# Patient Record
Sex: MALE | Race: BLACK OR AFRICAN AMERICAN | NOT HISPANIC OR LATINO | URBAN - METROPOLITAN AREA
[De-identification: names, ages, dates, MRNs, and addresses within clinical notes are randomized per-mention and may not be internally consistent; named-entity substitution may affect disease eponyms.]

---

## 2018-09-06 ENCOUNTER — INPATIENT (INPATIENT)
Facility: HOSPITAL | Age: 53
LOS: 7 days | Discharge: HOME | DRG: 236 | End: 2018-09-14
Attending: THORACIC SURGERY (CARDIOTHORACIC VASCULAR SURGERY) | Admitting: THORACIC SURGERY (CARDIOTHORACIC VASCULAR SURGERY)
Payer: MEDICAID

## 2018-09-06 VITALS
SYSTOLIC BLOOD PRESSURE: 114 MMHG | RESPIRATION RATE: 18 BRPM | TEMPERATURE: 98 F | WEIGHT: 153 LBS | DIASTOLIC BLOOD PRESSURE: 72 MMHG | HEART RATE: 73 BPM | OXYGEN SATURATION: 98 %

## 2018-09-06 DIAGNOSIS — Z98.1 ARTHRODESIS STATUS: Chronic | ICD-10-CM

## 2018-09-06 LAB
ALBUMIN SERPL ELPH-MCNC: 4.1 G/DL — SIGNIFICANT CHANGE UP (ref 3.3–5)
ALP SERPL-CCNC: 105 U/L — SIGNIFICANT CHANGE UP (ref 40–120)
ALT FLD-CCNC: 24 U/L — SIGNIFICANT CHANGE UP (ref 10–45)
ANION GAP SERPL CALC-SCNC: 17 MMOL/L — SIGNIFICANT CHANGE UP (ref 5–17)
APPEARANCE UR: CLEAR — SIGNIFICANT CHANGE UP
APTT BLD: 31.5 SEC — SIGNIFICANT CHANGE UP (ref 27.5–37.4)
AST SERPL-CCNC: 24 U/L — SIGNIFICANT CHANGE UP (ref 10–40)
BASOPHILS NFR BLD AUTO: 0.1 % — SIGNIFICANT CHANGE UP (ref 0–2)
BILIRUB SERPL-MCNC: 0.6 MG/DL — SIGNIFICANT CHANGE UP (ref 0.2–1.2)
BILIRUB UR-MCNC: NEGATIVE — SIGNIFICANT CHANGE UP
BLD GP AB SCN SERPL QL: NEGATIVE — SIGNIFICANT CHANGE UP
BUN SERPL-MCNC: 14 MG/DL — SIGNIFICANT CHANGE UP (ref 7–23)
CALCIUM SERPL-MCNC: 12.2 MG/DL — HIGH (ref 8.4–10.5)
CHLORIDE SERPL-SCNC: 100 MMOL/L — SIGNIFICANT CHANGE UP (ref 96–108)
CHOLEST SERPL-MCNC: 159 MG/DL — SIGNIFICANT CHANGE UP (ref 10–199)
CK MB CFR SERPL CALC: 1 NG/ML — SIGNIFICANT CHANGE UP (ref 0–6.7)
CK SERPL-CCNC: 46 U/L — SIGNIFICANT CHANGE UP (ref 30–200)
CO2 SERPL-SCNC: 21 MMOL/L — LOW (ref 22–31)
COLOR SPEC: YELLOW — SIGNIFICANT CHANGE UP
CREAT SERPL-MCNC: 0.97 MG/DL — SIGNIFICANT CHANGE UP (ref 0.5–1.3)
DIFF PNL FLD: NEGATIVE — SIGNIFICANT CHANGE UP
EOSINOPHIL NFR BLD AUTO: 1.6 % — SIGNIFICANT CHANGE UP (ref 0–6)
ESTIMATED AVERAGE GLUCOSE: 126 MG/DL — HIGH (ref 68–114)
GLUCOSE BLDC GLUCOMTR-MCNC: 113 MG/DL — HIGH (ref 70–99)
GLUCOSE BLDC GLUCOMTR-MCNC: 136 MG/DL — HIGH (ref 70–99)
GLUCOSE SERPL-MCNC: 123 MG/DL — HIGH (ref 70–99)
GLUCOSE UR QL: NEGATIVE — SIGNIFICANT CHANGE UP
HBA1C BLD-MCNC: 6 % — HIGH (ref 4–5.6)
HCT VFR BLD CALC: 41.9 % — SIGNIFICANT CHANGE UP (ref 39–50)
HDLC SERPL-MCNC: 44 MG/DL — SIGNIFICANT CHANGE UP
HGB BLD-MCNC: 13.9 G/DL — SIGNIFICANT CHANGE UP (ref 13–17)
INR BLD: 1.02 — SIGNIFICANT CHANGE UP (ref 0.88–1.16)
KETONES UR-MCNC: NEGATIVE — SIGNIFICANT CHANGE UP
LEUKOCYTE ESTERASE UR-ACNC: NEGATIVE — SIGNIFICANT CHANGE UP
LIPID PNL WITH DIRECT LDL SERPL: 75 MG/DL — SIGNIFICANT CHANGE UP
LYMPHOCYTES # BLD AUTO: 22.2 % — SIGNIFICANT CHANGE UP (ref 13–44)
MCHC RBC-ENTMCNC: 27.9 PG — SIGNIFICANT CHANGE UP (ref 27–34)
MCHC RBC-ENTMCNC: 33.2 G/DL — SIGNIFICANT CHANGE UP (ref 32–36)
MCV RBC AUTO: 84.1 FL — SIGNIFICANT CHANGE UP (ref 80–100)
MONOCYTES NFR BLD AUTO: 7.6 % — SIGNIFICANT CHANGE UP (ref 2–14)
NEUTROPHILS NFR BLD AUTO: 68.5 % — SIGNIFICANT CHANGE UP (ref 43–77)
NITRITE UR-MCNC: NEGATIVE — SIGNIFICANT CHANGE UP
NT-PROBNP SERPL-SCNC: 130 PG/ML — SIGNIFICANT CHANGE UP (ref 0–300)
PH UR: 6.5 — SIGNIFICANT CHANGE UP (ref 5–8)
PLATELET # BLD AUTO: 285 K/UL — SIGNIFICANT CHANGE UP (ref 150–400)
POTASSIUM SERPL-MCNC: 4.3 MMOL/L — SIGNIFICANT CHANGE UP (ref 3.5–5.3)
POTASSIUM SERPL-SCNC: 4.3 MMOL/L — SIGNIFICANT CHANGE UP (ref 3.5–5.3)
PROT SERPL-MCNC: 6.6 G/DL — SIGNIFICANT CHANGE UP (ref 6–8.3)
PROT UR-MCNC: NEGATIVE MG/DL — SIGNIFICANT CHANGE UP
PROTHROM AB SERPL-ACNC: 11.3 SEC — SIGNIFICANT CHANGE UP (ref 9.8–12.7)
RBC # BLD: 4.98 M/UL — SIGNIFICANT CHANGE UP (ref 4.2–5.8)
RBC # FLD: 13.5 % — SIGNIFICANT CHANGE UP (ref 10.3–16.9)
RH IG SCN BLD-IMP: POSITIVE — SIGNIFICANT CHANGE UP
SODIUM SERPL-SCNC: 138 MMOL/L — SIGNIFICANT CHANGE UP (ref 135–145)
SP GR SPEC: 1.01 — SIGNIFICANT CHANGE UP (ref 1–1.03)
TOTAL CHOLESTEROL/HDL RATIO MEASUREMENT: 3.6 RATIO — SIGNIFICANT CHANGE UP (ref 3.4–9.6)
TRIGL SERPL-MCNC: 199 MG/DL — HIGH (ref 10–149)
TROPONIN T SERPL-MCNC: <0.01 NG/ML — SIGNIFICANT CHANGE UP (ref 0–0.01)
TSH SERPL-MCNC: 3.36 UIU/ML — SIGNIFICANT CHANGE UP (ref 0.35–4.94)
UROBILINOGEN FLD QL: 0.2 E.U./DL — SIGNIFICANT CHANGE UP
WBC # BLD: 10 K/UL — SIGNIFICANT CHANGE UP (ref 3.8–10.5)
WBC # FLD AUTO: 10 K/UL — SIGNIFICANT CHANGE UP (ref 3.8–10.5)

## 2018-09-06 PROCEDURE — 93010 ELECTROCARDIOGRAM REPORT: CPT

## 2018-09-06 PROCEDURE — 93880 EXTRACRANIAL BILAT STUDY: CPT | Mod: 26

## 2018-09-06 PROCEDURE — 71046 X-RAY EXAM CHEST 2 VIEWS: CPT | Mod: 26

## 2018-09-06 PROCEDURE — 99223 1ST HOSP IP/OBS HIGH 75: CPT

## 2018-09-06 RX ORDER — METOPROLOL TARTRATE 50 MG
12.5 TABLET ORAL EVERY 12 HOURS
Qty: 0 | Refills: 0 | Status: DISCONTINUED | OUTPATIENT
Start: 2018-09-06 | End: 2018-09-07

## 2018-09-06 RX ORDER — CHLORHEXIDINE GLUCONATE 213 G/1000ML
1 SOLUTION TOPICAL ONCE
Qty: 0 | Refills: 0 | Status: DISCONTINUED | OUTPATIENT
Start: 2018-09-06 | End: 2018-09-06

## 2018-09-06 RX ORDER — DEXTROSE 50 % IN WATER 50 %
25 SYRINGE (ML) INTRAVENOUS ONCE
Qty: 0 | Refills: 0 | Status: DISCONTINUED | OUTPATIENT
Start: 2018-09-06 | End: 2018-09-10

## 2018-09-06 RX ORDER — SODIUM CHLORIDE 9 MG/ML
1000 INJECTION, SOLUTION INTRAVENOUS
Qty: 0 | Refills: 0 | Status: DISCONTINUED | OUTPATIENT
Start: 2018-09-06 | End: 2018-09-10

## 2018-09-06 RX ORDER — HEPARIN SODIUM 5000 [USP'U]/ML
5000 INJECTION INTRAVENOUS; SUBCUTANEOUS EVERY 8 HOURS
Qty: 0 | Refills: 0 | Status: DISCONTINUED | OUTPATIENT
Start: 2018-09-06 | End: 2018-09-08

## 2018-09-06 RX ORDER — ACETAMINOPHEN 500 MG
650 TABLET ORAL EVERY 4 HOURS
Qty: 0 | Refills: 0 | Status: DISCONTINUED | OUTPATIENT
Start: 2018-09-06 | End: 2018-09-10

## 2018-09-06 RX ORDER — DEXTROSE 50 % IN WATER 50 %
12.5 SYRINGE (ML) INTRAVENOUS ONCE
Qty: 0 | Refills: 0 | Status: DISCONTINUED | OUTPATIENT
Start: 2018-09-06 | End: 2018-09-10

## 2018-09-06 RX ORDER — NITROGLYCERIN 6.5 MG
0.4 CAPSULE, EXTENDED RELEASE ORAL
Qty: 0 | Refills: 0 | Status: DISCONTINUED | OUTPATIENT
Start: 2018-09-06 | End: 2018-09-10

## 2018-09-06 RX ORDER — GLUCAGON INJECTION, SOLUTION 0.5 MG/.1ML
1 INJECTION, SOLUTION SUBCUTANEOUS ONCE
Qty: 0 | Refills: 0 | Status: DISCONTINUED | OUTPATIENT
Start: 2018-09-06 | End: 2018-09-10

## 2018-09-06 RX ORDER — SENNA PLUS 8.6 MG/1
2 TABLET ORAL AT BEDTIME
Qty: 0 | Refills: 0 | Status: DISCONTINUED | OUTPATIENT
Start: 2018-09-06 | End: 2018-09-10

## 2018-09-06 RX ORDER — SODIUM CHLORIDE 9 MG/ML
3 INJECTION INTRAMUSCULAR; INTRAVENOUS; SUBCUTANEOUS EVERY 8 HOURS
Qty: 0 | Refills: 0 | Status: DISCONTINUED | OUTPATIENT
Start: 2018-09-06 | End: 2018-09-10

## 2018-09-06 RX ORDER — INFLUENZA VIRUS VACCINE 15; 15; 15; 15 UG/.5ML; UG/.5ML; UG/.5ML; UG/.5ML
0.5 SUSPENSION INTRAMUSCULAR ONCE
Qty: 0 | Refills: 0 | Status: COMPLETED | OUTPATIENT
Start: 2018-09-06 | End: 2018-09-06

## 2018-09-06 RX ORDER — INSULIN LISPRO 100/ML
VIAL (ML) SUBCUTANEOUS
Qty: 0 | Refills: 0 | Status: DISCONTINUED | OUTPATIENT
Start: 2018-09-06 | End: 2018-09-10

## 2018-09-06 RX ORDER — PANTOPRAZOLE SODIUM 20 MG/1
40 TABLET, DELAYED RELEASE ORAL
Qty: 0 | Refills: 0 | Status: DISCONTINUED | OUTPATIENT
Start: 2018-09-06 | End: 2018-09-10

## 2018-09-06 RX ORDER — METOPROLOL TARTRATE 50 MG
12.5 TABLET ORAL EVERY 12 HOURS
Qty: 0 | Refills: 0 | Status: DISCONTINUED | OUTPATIENT
Start: 2018-09-06 | End: 2018-09-06

## 2018-09-06 RX ORDER — CHLORHEXIDINE GLUCONATE 213 G/1000ML
10 SOLUTION TOPICAL ONCE
Qty: 0 | Refills: 0 | Status: DISCONTINUED | OUTPATIENT
Start: 2018-09-06 | End: 2018-09-06

## 2018-09-06 RX ORDER — ASPIRIN/CALCIUM CARB/MAGNESIUM 324 MG
81 TABLET ORAL DAILY
Qty: 0 | Refills: 0 | Status: DISCONTINUED | OUTPATIENT
Start: 2018-09-06 | End: 2018-09-10

## 2018-09-06 RX ORDER — DEXTROSE 50 % IN WATER 50 %
15 SYRINGE (ML) INTRAVENOUS ONCE
Qty: 0 | Refills: 0 | Status: DISCONTINUED | OUTPATIENT
Start: 2018-09-06 | End: 2018-09-10

## 2018-09-06 RX ORDER — DOCUSATE SODIUM 100 MG
100 CAPSULE ORAL THREE TIMES A DAY
Qty: 0 | Refills: 0 | Status: DISCONTINUED | OUTPATIENT
Start: 2018-09-06 | End: 2018-09-10

## 2018-09-06 RX ADMIN — SENNA PLUS 2 TABLET(S): 8.6 TABLET ORAL at 21:59

## 2018-09-06 RX ADMIN — Medication 100 MILLIGRAM(S): at 21:59

## 2018-09-06 RX ADMIN — Medication 12.5 MILLIGRAM(S): at 19:05

## 2018-09-06 RX ADMIN — Medication 650 MILLIGRAM(S): at 19:31

## 2018-09-06 RX ADMIN — Medication 650 MILLIGRAM(S): at 21:58

## 2018-09-06 RX ADMIN — Medication 650 MILLIGRAM(S): at 18:49

## 2018-09-06 RX ADMIN — HEPARIN SODIUM 5000 UNIT(S): 5000 INJECTION INTRAVENOUS; SUBCUTANEOUS at 21:59

## 2018-09-06 RX ADMIN — SODIUM CHLORIDE 3 MILLILITER(S): 9 INJECTION INTRAMUSCULAR; INTRAVENOUS; SUBCUTANEOUS at 21:52

## 2018-09-06 RX ADMIN — PANTOPRAZOLE SODIUM 40 MILLIGRAM(S): 20 TABLET, DELAYED RELEASE ORAL at 18:46

## 2018-09-06 RX ADMIN — Medication 81 MILLIGRAM(S): at 18:46

## 2018-09-06 NOTE — H&P ADULT - ASSESSMENT
53 year old male, former 2.5 pack year smoker (quit 3 months ago) with history of HTN, HLD, and borderline DM who developed substernal chest pain approximately 3 days ago with progression in severity to 9/5/18 where pain was described as substernal, 10/10, stabbing in nature with radiation to his back and left arm, associated with nausea/vomiting and dizziness which [place] [facility]  He does report a similar episode in 2012 with stress test at that time negative and no medical/surgical therapies given at that time.  On arrival to ED, he underwent emergent CTA chest which r/o aortic dissection and subsequently underwent cardiac catheterization which demonstrated 70% mLAD, 90% OM3, 99% ostial Ramus, 100%  proximal Ramus, 99% ostial RCA, and 95% left AV groove artery with EF 75%.  Dr. Reddy, CT Surgery, was consulted and patient was transferred to [facility] on 9/6/18 under his care to complete pre-surgical evaluation.    Neurovascular: No delirium, pain well managed on current regimen  -C/w PRNs for Pain control  -Monitor neuro status    Respiratory: Saturates well on room air.   -CXR on admission, f/u results.   -Encourage IS 10x/hour while awake, Cough and deep breathing exercises  -Monitor respiratory status via SpO2    Cardiovascular: History above, admitted for CABG evaluation, EF 75%  -Resume appropriate home meds  -ECHO, Carotids, PFTs pending.  -Possible OR tomorrow, will confirm with Dr. Reddy in AM  -Monitor HR/BP/Tele    GI: Tolerating PO  -NPO at [facility] for possible OR tomorrow.   -Prophylaxis: Protonix  -C/w bowel regimen    /Renal:   -BUN/Cr: pending   -Trend Cr on AM labs  -Replete electrolytes as needed    ID: Afebrile, asymptomatic  -WCC: pending   -Continue to monitor for SIRS/Sepsis syndrome while inpatient    Endo: Reported pre-DM  -A1C: pending   -TSH: pending     Heme:   -H/H: pending   -F/u admission labs.   -DVT ppx: HSQ 5000 u q8h and SCDs    Disposition: Admit to [place].  OR this admission.

## 2018-09-06 NOTE — H&P ADULT - NSHPSOCIALHISTORY_GEN_ALL_CORE
SOCIAL HISTORY:  Smoker:  FORMER- 1/2ppd x 5 years, quit 3 months ago  ETOH use:  NO    Ilicit Drug use:  NO  Occupation: Disabled; former    Assisted device use (Cane / Walker): Cane  Live with: Girlfriend, 8 steps

## 2018-09-06 NOTE — H&P ADULT - HISTORY OF PRESENT ILLNESS
This is a 53 year old male, former 2.5 pack year smoker (quit 3 months ago) with history of HTN, HLD, and borderline DM who developed substernal chest pain approximately 3 days ago with progression in severity to 9/5/18 where pain was described as substernal, 10/10, stabbing in nature with radiation to his back and left arm, associated with nausea/vomiting and dizziness which prompted [place] [facility]  He does report a similar episode in 2012 with stress test at that time negative and no medical/surgical therapies given at that time.  On arrival to ED, he underwent emergent CTA chest which r/o aortic dissection and subsequently underwent cardiac catheterization which demonstrated 70% mLAD, 90% OM3, 99% ostial Ramus, 100%  proximal Ramus, 99% ostial RCA, and 95% left AV groove artery with EF 75%.  Dr. Reddy, CT Surgery, was consulted and patient was transferred to [facility] on 9/6/18 under his care to complete pre-surgical evaluation.  On admission, patient feels well and offers no acute complaints.  Denies HA, AMS, CP, palpitations, SOB, cough, hemoptysis, n/v/d, fever.

## 2018-09-06 NOTE — H&P ADULT - NSHPREVIEWOFSYSTEMS_GEN_ALL_CORE
Review of Systems  CONSTITUTIONAL:  Denies Fevers / chills, sweats, fatigue, weight loss, weight gain                                      NEURO:  Denies paresthesia, seizures, syncope, confusion                                                                                EYES:  Denies Blurry vision, discharge, pain, loss of vision                                                                                    ENMT:  Denies Difficulty hearing, vertigo, dysphagia, epistaxis, recent dental work                                       CV:  +Chest pain, see HPI; Denies palpitations, GONSALES, orthopnea                                                                                          RESPIRATORY:  Denies Wheezing, SOB, cough / sputum, hemoptysis                                                                GI:  +N/V, see HPI; Denies diarrhea, constipation, melena, difficulty swallowing                                               : Denies Hematuria, dysuria, urgency, incontinence                                                                                         MUSCULOSKELETAL:  Denies arthritis, joint swelling, muscle weakness                                                             SKIN/BREAST:  Denies rash, itching, hair loss, masses                                                                                            PSYCH:  Denies depression, anxiety, suicidal ideation                                                                                               HEME/LYMPH:  Denies bruises easily, enlarged lymph nodes, tender lymph nodes                                        ENDOCRINE:  Denies cold intolerance, heat intolerance, polydipsia

## 2018-09-07 PROBLEM — Z00.00 ENCOUNTER FOR PREVENTIVE HEALTH EXAMINATION: Status: ACTIVE | Noted: 2018-09-07

## 2018-09-07 LAB
ANION GAP SERPL CALC-SCNC: 11 MMOL/L — SIGNIFICANT CHANGE UP (ref 5–17)
APTT BLD: 29.8 SEC — SIGNIFICANT CHANGE UP (ref 27.5–37.4)
BUN SERPL-MCNC: 17 MG/DL — SIGNIFICANT CHANGE UP (ref 7–23)
CALCIUM SERPL-MCNC: 12.3 MG/DL — HIGH (ref 8.4–10.5)
CHLORIDE SERPL-SCNC: 103 MMOL/L — SIGNIFICANT CHANGE UP (ref 96–108)
CO2 SERPL-SCNC: 23 MMOL/L — SIGNIFICANT CHANGE UP (ref 22–31)
CREAT SERPL-MCNC: 0.9 MG/DL — SIGNIFICANT CHANGE UP (ref 0.5–1.3)
GLUCOSE BLDC GLUCOMTR-MCNC: 109 MG/DL — HIGH (ref 70–99)
GLUCOSE BLDC GLUCOMTR-MCNC: 114 MG/DL — HIGH (ref 70–99)
GLUCOSE BLDC GLUCOMTR-MCNC: 137 MG/DL — HIGH (ref 70–99)
GLUCOSE BLDC GLUCOMTR-MCNC: 97 MG/DL — SIGNIFICANT CHANGE UP (ref 70–99)
GLUCOSE SERPL-MCNC: 116 MG/DL — HIGH (ref 70–99)
HBA1C BLD-MCNC: 6 % — HIGH (ref 4–5.6)
HCT VFR BLD CALC: 40.7 % — SIGNIFICANT CHANGE UP (ref 39–50)
HGB BLD-MCNC: 13.6 G/DL — SIGNIFICANT CHANGE UP (ref 13–17)
INR BLD: 1.04 — SIGNIFICANT CHANGE UP (ref 0.88–1.16)
MAGNESIUM SERPL-MCNC: 2.2 MG/DL — SIGNIFICANT CHANGE UP (ref 1.6–2.6)
MCHC RBC-ENTMCNC: 28.2 PG — SIGNIFICANT CHANGE UP (ref 27–34)
MCHC RBC-ENTMCNC: 33.4 G/DL — SIGNIFICANT CHANGE UP (ref 32–36)
MCV RBC AUTO: 84.4 FL — SIGNIFICANT CHANGE UP (ref 80–100)
PLATELET # BLD AUTO: 246 K/UL — SIGNIFICANT CHANGE UP (ref 150–400)
POTASSIUM SERPL-MCNC: 4.2 MMOL/L — SIGNIFICANT CHANGE UP (ref 3.5–5.3)
POTASSIUM SERPL-SCNC: 4.2 MMOL/L — SIGNIFICANT CHANGE UP (ref 3.5–5.3)
PROTHROM AB SERPL-ACNC: 11.6 SEC — SIGNIFICANT CHANGE UP (ref 9.8–12.7)
RBC # BLD: 4.82 M/UL — SIGNIFICANT CHANGE UP (ref 4.2–5.8)
RBC # FLD: 13.4 % — SIGNIFICANT CHANGE UP (ref 10.3–16.9)
SODIUM SERPL-SCNC: 137 MMOL/L — SIGNIFICANT CHANGE UP (ref 135–145)
TROPONIN T SERPL-MCNC: <0.01 NG/ML — SIGNIFICANT CHANGE UP (ref 0–0.01)
WBC # BLD: 10.6 K/UL — HIGH (ref 3.8–10.5)
WBC # FLD AUTO: 10.6 K/UL — HIGH (ref 3.8–10.5)

## 2018-09-07 PROCEDURE — 93010 ELECTROCARDIOGRAM REPORT: CPT

## 2018-09-07 PROCEDURE — 94010 BREATHING CAPACITY TEST: CPT | Mod: 26

## 2018-09-07 PROCEDURE — 93306 TTE W/DOPPLER COMPLETE: CPT | Mod: 26

## 2018-09-07 RX ORDER — ATORVASTATIN CALCIUM 80 MG/1
40 TABLET, FILM COATED ORAL AT BEDTIME
Qty: 0 | Refills: 0 | Status: DISCONTINUED | OUTPATIENT
Start: 2018-09-07 | End: 2018-09-10

## 2018-09-07 RX ORDER — METOPROLOL TARTRATE 50 MG
5 TABLET ORAL ONCE
Qty: 0 | Refills: 0 | Status: COMPLETED | OUTPATIENT
Start: 2018-09-07 | End: 2018-09-07

## 2018-09-07 RX ORDER — ISOSORBIDE MONONITRATE 60 MG/1
30 TABLET, EXTENDED RELEASE ORAL DAILY
Qty: 0 | Refills: 0 | Status: DISCONTINUED | OUTPATIENT
Start: 2018-09-08 | End: 2018-09-08

## 2018-09-07 RX ORDER — METOPROLOL TARTRATE 50 MG
12.5 TABLET ORAL EVERY 12 HOURS
Qty: 0 | Refills: 0 | Status: DISCONTINUED | OUTPATIENT
Start: 2018-09-08 | End: 2018-09-08

## 2018-09-07 RX ADMIN — SODIUM CHLORIDE 3 MILLILITER(S): 9 INJECTION INTRAMUSCULAR; INTRAVENOUS; SUBCUTANEOUS at 21:12

## 2018-09-07 RX ADMIN — SODIUM CHLORIDE 3 MILLILITER(S): 9 INJECTION INTRAMUSCULAR; INTRAVENOUS; SUBCUTANEOUS at 06:26

## 2018-09-07 RX ADMIN — Medication 100 MILLIGRAM(S): at 06:17

## 2018-09-07 RX ADMIN — SODIUM CHLORIDE 3 MILLILITER(S): 9 INJECTION INTRAMUSCULAR; INTRAVENOUS; SUBCUTANEOUS at 14:16

## 2018-09-07 RX ADMIN — Medication 650 MILLIGRAM(S): at 19:46

## 2018-09-07 RX ADMIN — PANTOPRAZOLE SODIUM 40 MILLIGRAM(S): 20 TABLET, DELAYED RELEASE ORAL at 06:16

## 2018-09-07 RX ADMIN — Medication 650 MILLIGRAM(S): at 11:42

## 2018-09-07 RX ADMIN — Medication 12.5 MILLIGRAM(S): at 06:16

## 2018-09-07 RX ADMIN — Medication 0.4 MILLIGRAM(S): at 19:10

## 2018-09-07 RX ADMIN — HEPARIN SODIUM 5000 UNIT(S): 5000 INJECTION INTRAVENOUS; SUBCUTANEOUS at 14:20

## 2018-09-07 RX ADMIN — Medication 650 MILLIGRAM(S): at 12:00

## 2018-09-07 RX ADMIN — Medication 5 MILLIGRAM(S): at 19:17

## 2018-09-07 RX ADMIN — Medication 12.5 MILLIGRAM(S): at 17:33

## 2018-09-07 RX ADMIN — Medication 100 MILLIGRAM(S): at 14:20

## 2018-09-07 RX ADMIN — SENNA PLUS 2 TABLET(S): 8.6 TABLET ORAL at 21:41

## 2018-09-07 RX ADMIN — HEPARIN SODIUM 5000 UNIT(S): 5000 INJECTION INTRAVENOUS; SUBCUTANEOUS at 21:41

## 2018-09-07 RX ADMIN — Medication 650 MILLIGRAM(S): at 20:00

## 2018-09-07 RX ADMIN — Medication 100 MILLIGRAM(S): at 21:41

## 2018-09-07 RX ADMIN — HEPARIN SODIUM 5000 UNIT(S): 5000 INJECTION INTRAVENOUS; SUBCUTANEOUS at 06:26

## 2018-09-07 RX ADMIN — ATORVASTATIN CALCIUM 40 MILLIGRAM(S): 80 TABLET, FILM COATED ORAL at 21:41

## 2018-09-07 RX ADMIN — Medication 81 MILLIGRAM(S): at 11:42

## 2018-09-07 NOTE — PROGRESS NOTE ADULT - SUBJECTIVE AND OBJECTIVE BOX
Patient discussed on morning rounds with Dr. Reddy    Operation / Date: CAD, CABG monday EF 75%    SUBJECTIVE ASSESSMENT:  53y Male seen and examined. No acute events OVN, no acute complaints. Chest pain free. Denies fever, chest pain, palpitations, SOB, abdominal pain, n/v.         Vital Signs Last 24 Hrs  T(C): 36.3 (07 Sep 2018 05:00), Max: 36.7 (06 Sep 2018 16:30)  T(F): 97.4 (07 Sep 2018 05:00), Max: 98.1 (06 Sep 2018 16:30)  HR: 62 (07 Sep 2018 05:23) (62 - 85)  BP: 135/80 (07 Sep 2018 05:23) (107/62 - 136/71)  BP(mean): 96 (07 Sep 2018 05:23) (76 - 100)  RR: 18 (07 Sep 2018 05:23) (18 - 20)  SpO2: 97% (07 Sep 2018 05:23) (96% - 98%)  I&O's Detail    06 Sep 2018 07:01  -  07 Sep 2018 07:00  --------------------------------------------------------  IN:    IV PiggyBack: 200 mL    Oral Fluid: 180 mL  Total IN: 380 mL    OUT:    Voided: 950 mL  Total OUT: 950 mL    Total NET: -570 mL        PHYSICAL EXAM:    General: Patient lying comfortably in bed, no acute distress     Neurological: Alert and oriented. No focal neurological deficits     Cardiovascular: S1S2, RRR, no murmurs appreciated on exam     Respiratory: Clear to ausculation bilaterally, no wheeze/rhonchi/rales    Gastrointestinal: Abdomen soft, non tender, non distended     Extremities: Warm and well perfused. No edema, no calf tenderness     Vascular: 2+ Peripheral pulses b/l     Incision Sites: R radial cath site: CDI, no hematoma.     LABS:                        13.6   10.6  )-----------( 246      ( 07 Sep 2018 06:53 )             40.7       No    PT/INR - ( 07 Sep 2018 06:55 )   PT: 11.6 sec;   INR: 1.04          PTT - ( 07 Sep 2018 06:55 )  PTT:29.8 sec        137  |  103  |  17  ----------------------------<  116<H>  4.2   |  23  |  0.90    Ca    12.3<H>      07 Sep 2018 06:52  Mg     2.2         TPro  6.6  /  Alb  4.1  /  TBili  0.6  /  DBili  x   /  AST  24  /  ALT  24  /  AlkPhos  105        Urinalysis Basic - ( 06 Sep 2018 19:04 )    Color: Yellow / Appearance: Clear / S.010 / pH: x  Gluc: x / Ketone: NEGATIVE  / Bili: Negative / Urobili: 0.2 E.U./dL   Blood: x / Protein: NEGATIVE mg/dL / Nitrite: NEGATIVE   Leuk Esterase: NEGATIVE / RBC: x / WBC x   Sq Epi: x / Non Sq Epi: x / Bacteria: x        MEDICATIONS  (STANDING):  aspirin enteric coated 81 milliGRAM(s) Oral daily  dextrose 5%. 1000 milliLiter(s) (50 mL/Hr) IV Continuous <Continuous>  dextrose 50% Injectable 12.5 Gram(s) IV Push once  dextrose 50% Injectable 25 Gram(s) IV Push once  dextrose 50% Injectable 25 Gram(s) IV Push once  docusate sodium 100 milliGRAM(s) Oral three times a day  heparin  Injectable 5000 Unit(s) SubCutaneous every 8 hours  influenza   Vaccine 0.5 milliLiter(s) IntraMuscular once  insulin lispro (HumaLOG) corrective regimen sliding scale   SubCutaneous Before meals and at bedtime  metoprolol tartrate 12.5 milliGRAM(s) Oral every 12 hours  pantoprazole    Tablet 40 milliGRAM(s) Oral before breakfast  senna 2 Tablet(s) Oral at bedtime  sodium chloride 0.9% lock flush 3 milliLiter(s) IV Push every 8 hours    MEDICATIONS  (PRN):  acetaminophen   Tablet .. 650 milliGRAM(s) Oral every 4 hours PRN Mild Pain (1 - 3)  dextrose 40% Gel 15 Gram(s) Oral once PRN Blood Glucose LESS THAN 70 milliGRAM(s)/deciliter  glucagon  Injectable 1 milliGRAM(s) IntraMuscular once PRN Glucose LESS THAN 70 milligrams/deciliter  nitroglycerin     SubLingual 0.4 milliGRAM(s) SubLingual every 5 minutes PRN Chest Pain        RADIOLOGY & ADDITIONAL TESTS:  < from: Xray Chest 2 Views PA/Lat (18 @ 21:29) >  FINDINGS: The hilar, mediastinal, and cardiac contours are unremarkable.   No focal consolidation,pneumothorax, or pleural effusions are seen. The   osseous structures are unremarkable.     IMPRESSION: Normal chest x-ray.     < end of copied text >

## 2018-09-07 NOTE — PROGRESS NOTE ADULT - ASSESSMENT
53 year old M, former 2.5 pack year smoker (quit 3 months ago), PMHx HTN, HLD, and borderline DM [place] [facility] after developing substernal chest pain, 10/10 in severity, stabbing in nature, radiation to back and L arm associated with n/v. He does report a similar episode in 2012 with stress test at that time negative and no medical/surgical therapies given at that time.  On arrival to ED, he underwent emergent CTA chest which r/o aortic dissection and subsequently underwent cardiac catheterization which demonstrated 70% mLAD, 90% OM3, 99% ostial Ramus, 100%  proximal Ramus, 99% ostial RCA, and 95% left AV groove artery with EF 75%.  Dr. Reddy, CT Surgery, was consulted and patient was transferred to [facility] on 9/6/18 under his care to complete pre-surgical evaluation.  Patient chest pain free undergoing PST for CABG on monday.     A/P:  Neurovascular: No delirium. Pain well controlled with current regimen.  -Tylenol PRN pain    Cardiovascular: Hemodynamically stable. HR controlled.  -Hx HTn, HLD, CAD, pre-op CABG, patient enrolled in ARY trial   -continue metoprolol 12.5mg BID, asa 81mg daily, atorvastatin 40mg QHS, nitro SL prn pain  -L radial to be tested for conduit   -f/u carotids/echo  -monitor HR/Bp/tele    Respiratory: 02 Sat = 98% on RA.  -Encourage ambulation, C+DB and Use of IS 10x / hr while awake.  -CXR- stable  -f/u PFTs    GI: Stable.  -protonix PPX.  -Continue bowel regimen  -PO Diet.    Renal / : BUN/Cr 17/0.90  -Monitor renal function.  -Monitor I/O's.    Endocrine:    -A1c 6.0- Monitor FS, continue ISS  -TSH WNL    Hematologic: H&H stable  -f/u AM CBC    ID: Afebrile, WBC  10.6  -Observe for SIRS/Sepsis Syndrome.    Prophylaxis:  -DVT prophylaxis with 5000 SubQ Heparin q8h.  -SCD's    Disposition:  PST in progress,  CABG monday

## 2018-09-08 LAB
ANION GAP SERPL CALC-SCNC: 17 MMOL/L — SIGNIFICANT CHANGE UP (ref 5–17)
APTT BLD: 49.9 SEC — HIGH (ref 27.5–37.4)
APTT BLD: 56.5 SEC — HIGH (ref 27.5–37.4)
BUN SERPL-MCNC: 12 MG/DL — SIGNIFICANT CHANGE UP (ref 7–23)
CALCIUM SERPL-MCNC: 12.9 MG/DL — HIGH (ref 8.4–10.5)
CHLORIDE SERPL-SCNC: 98 MMOL/L — SIGNIFICANT CHANGE UP (ref 96–108)
CO2 SERPL-SCNC: 21 MMOL/L — LOW (ref 22–31)
CREAT SERPL-MCNC: 1 MG/DL — SIGNIFICANT CHANGE UP (ref 0.5–1.3)
GLUCOSE BLDC GLUCOMTR-MCNC: 107 MG/DL — HIGH (ref 70–99)
GLUCOSE BLDC GLUCOMTR-MCNC: 111 MG/DL — HIGH (ref 70–99)
GLUCOSE BLDC GLUCOMTR-MCNC: 119 MG/DL — HIGH (ref 70–99)
GLUCOSE BLDC GLUCOMTR-MCNC: 142 MG/DL — HIGH (ref 70–99)
GLUCOSE SERPL-MCNC: 187 MG/DL — HIGH (ref 70–99)
HCT VFR BLD CALC: 43.1 % — SIGNIFICANT CHANGE UP (ref 39–50)
HGB BLD-MCNC: 14.1 G/DL — SIGNIFICANT CHANGE UP (ref 13–17)
INR BLD: 1.04 — SIGNIFICANT CHANGE UP (ref 0.88–1.16)
MAGNESIUM SERPL-MCNC: 2.1 MG/DL — SIGNIFICANT CHANGE UP (ref 1.6–2.6)
MCHC RBC-ENTMCNC: 27.5 PG — SIGNIFICANT CHANGE UP (ref 27–34)
MCHC RBC-ENTMCNC: 32.7 G/DL — SIGNIFICANT CHANGE UP (ref 32–36)
MCV RBC AUTO: 84 FL — SIGNIFICANT CHANGE UP (ref 80–100)
PLATELET # BLD AUTO: 268 K/UL — SIGNIFICANT CHANGE UP (ref 150–400)
POTASSIUM SERPL-MCNC: 4.7 MMOL/L — SIGNIFICANT CHANGE UP (ref 3.5–5.3)
POTASSIUM SERPL-SCNC: 4.7 MMOL/L — SIGNIFICANT CHANGE UP (ref 3.5–5.3)
PROTHROM AB SERPL-ACNC: 11.5 SEC — SIGNIFICANT CHANGE UP (ref 9.8–12.7)
RBC # BLD: 5.13 M/UL — SIGNIFICANT CHANGE UP (ref 4.2–5.8)
RBC # FLD: 13.3 % — SIGNIFICANT CHANGE UP (ref 10.3–16.9)
SODIUM SERPL-SCNC: 136 MMOL/L — SIGNIFICANT CHANGE UP (ref 135–145)
WBC # BLD: 9.5 K/UL — SIGNIFICANT CHANGE UP (ref 3.8–10.5)
WBC # FLD AUTO: 9.5 K/UL — SIGNIFICANT CHANGE UP (ref 3.8–10.5)

## 2018-09-08 PROCEDURE — 99291 CRITICAL CARE FIRST HOUR: CPT

## 2018-09-08 RX ORDER — OXYCODONE AND ACETAMINOPHEN 5; 325 MG/1; MG/1
1 TABLET ORAL ONCE
Qty: 0 | Refills: 0 | Status: DISCONTINUED | OUTPATIENT
Start: 2018-09-08 | End: 2018-09-08

## 2018-09-08 RX ORDER — METOPROLOL TARTRATE 50 MG
12.5 TABLET ORAL ONCE
Qty: 0 | Refills: 0 | Status: COMPLETED | OUTPATIENT
Start: 2018-09-08 | End: 2018-09-08

## 2018-09-08 RX ORDER — METOPROLOL TARTRATE 50 MG
12.5 TABLET ORAL DAILY
Qty: 0 | Refills: 0 | Status: DISCONTINUED | OUTPATIENT
Start: 2018-09-08 | End: 2018-09-08

## 2018-09-08 RX ORDER — HEPARIN SODIUM 5000 [USP'U]/ML
800 INJECTION INTRAVENOUS; SUBCUTANEOUS
Qty: 25000 | Refills: 0 | Status: DISCONTINUED | OUTPATIENT
Start: 2018-09-08 | End: 2018-09-08

## 2018-09-08 RX ORDER — ISOSORBIDE MONONITRATE 60 MG/1
30 TABLET, EXTENDED RELEASE ORAL DAILY
Qty: 0 | Refills: 0 | Status: DISCONTINUED | OUTPATIENT
Start: 2018-09-08 | End: 2018-09-10

## 2018-09-08 RX ORDER — METOPROLOL TARTRATE 50 MG
25 TABLET ORAL EVERY 8 HOURS
Qty: 0 | Refills: 0 | Status: DISCONTINUED | OUTPATIENT
Start: 2018-09-08 | End: 2018-09-10

## 2018-09-08 RX ORDER — HEPARIN SODIUM 5000 [USP'U]/ML
1000 INJECTION INTRAVENOUS; SUBCUTANEOUS
Qty: 25000 | Refills: 0 | Status: DISCONTINUED | OUTPATIENT
Start: 2018-09-08 | End: 2018-09-10

## 2018-09-08 RX ADMIN — Medication 12.5 MILLIGRAM(S): at 06:52

## 2018-09-08 RX ADMIN — HEPARIN SODIUM 10 UNIT(S)/HR: 5000 INJECTION INTRAVENOUS; SUBCUTANEOUS at 17:58

## 2018-09-08 RX ADMIN — HEPARIN SODIUM 8 UNIT(S)/HR: 5000 INJECTION INTRAVENOUS; SUBCUTANEOUS at 09:52

## 2018-09-08 RX ADMIN — Medication 81 MILLIGRAM(S): at 11:50

## 2018-09-08 RX ADMIN — Medication 650 MILLIGRAM(S): at 09:00

## 2018-09-08 RX ADMIN — Medication 650 MILLIGRAM(S): at 18:02

## 2018-09-08 RX ADMIN — OXYCODONE AND ACETAMINOPHEN 1 TABLET(S): 5; 325 TABLET ORAL at 12:30

## 2018-09-08 RX ADMIN — Medication 650 MILLIGRAM(S): at 08:16

## 2018-09-08 RX ADMIN — PANTOPRAZOLE SODIUM 40 MILLIGRAM(S): 20 TABLET, DELAYED RELEASE ORAL at 06:52

## 2018-09-08 RX ADMIN — ATORVASTATIN CALCIUM 40 MILLIGRAM(S): 80 TABLET, FILM COATED ORAL at 22:34

## 2018-09-08 RX ADMIN — Medication 650 MILLIGRAM(S): at 19:00

## 2018-09-08 RX ADMIN — OXYCODONE AND ACETAMINOPHEN 1 TABLET(S): 5; 325 TABLET ORAL at 22:07

## 2018-09-08 RX ADMIN — Medication 25 MILLIGRAM(S): at 13:46

## 2018-09-08 RX ADMIN — ISOSORBIDE MONONITRATE 30 MILLIGRAM(S): 60 TABLET, EXTENDED RELEASE ORAL at 06:53

## 2018-09-08 RX ADMIN — Medication 100 MILLIGRAM(S): at 13:46

## 2018-09-08 RX ADMIN — OXYCODONE AND ACETAMINOPHEN 1 TABLET(S): 5; 325 TABLET ORAL at 11:50

## 2018-09-08 RX ADMIN — SODIUM CHLORIDE 3 MILLILITER(S): 9 INJECTION INTRAMUSCULAR; INTRAVENOUS; SUBCUTANEOUS at 05:41

## 2018-09-08 RX ADMIN — Medication 25 MILLIGRAM(S): at 22:34

## 2018-09-08 RX ADMIN — Medication 100 MILLIGRAM(S): at 06:52

## 2018-09-08 RX ADMIN — OXYCODONE AND ACETAMINOPHEN 1 TABLET(S): 5; 325 TABLET ORAL at 22:35

## 2018-09-08 RX ADMIN — HEPARIN SODIUM 5000 UNIT(S): 5000 INJECTION INTRAVENOUS; SUBCUTANEOUS at 06:52

## 2018-09-08 RX ADMIN — SODIUM CHLORIDE 3 MILLILITER(S): 9 INJECTION INTRAMUSCULAR; INTRAVENOUS; SUBCUTANEOUS at 13:26

## 2018-09-08 RX ADMIN — Medication 12.5 MILLIGRAM(S): at 09:52

## 2018-09-08 NOTE — PROGRESS NOTE ADULT - ASSESSMENT
53 year old M, former 2.5 pack year smoker (quit 3 months ago), PMHx HTN, HLD, and borderline DM [place] [facility] [place] substernal chest pain, 10/10 in severity, stabbing in nature, radiation to back and L arm associated with n/v. He does report a similar episode in 2012 with stress test at that time negative and no medical/surgical therapies given at that time.  On arrival to ED, he underwent emergent CTA chest which r/o aortic dissection and subsequently underwent cardiac catheterization which demonstrated 70% mLAD, 90% OM3, 99% ostial Ramus, 100%  proximal Ramus, 99% ostial RCA, and 95% left AV groove artery with EF 75%.  Dr. Reddy, CT Surgery, was consulted and patient was transferred to [facility] on 9/6/18 under his care to complete pre-surgical evaluation.  Patient chest pain free undergoing PST for CABG on monday.     A/P:  Neurovascular: No delirium. Pain well controlled with current regimen.  -Tylenol PRN pain    Cardiovascular: Hemodynamically stable. HR controlled.  -Hx HTn, HLD, CAD, pre-op CABG, patient enrolled in ARY trial   -patient had chest pain last night, relieved with SL nitro, EKG no change  -continue metoprolol 25mg TID, asa 81mg daily, atorvastatin 40mg QHS, nitro SL prn pain, Imdur 30mg QD  -per Dr. Carbajal started on hep gtt, f/u PTT 4pm  -Echo: EF Nl trace MR/TR/DC, mild LVH  -carotids negative  -monitor HR/Bp/tele    Respiratory: 02 Sat = 98% on RA.  -Encourage ambulation, C+DB and Use of IS 10x / hr while awake.  -CXR- stable  -f/u PFTs    GI: Stable.  -protonix PPX.  -Continue bowel regimen  -PO Diet.    Renal / : BUN/Cr 12/1.00  -Monitor renal function.  -Monitor I/O's.    Endocrine:    -A1c 6.0- Monitor FS, continue ISS  -TSH WNL    Hematologic: H&H stable  -f/u AM CBC    ID: Afebrile, WBC  9.5  -Observe for SIRS/Sepsis Syndrome.    Prophylaxis:  -hep gtt for ACS  -SCD's    Disposition:  PST in progress,  CABG monday

## 2018-09-08 NOTE — PROGRESS NOTE ADULT - SUBJECTIVE AND OBJECTIVE BOX
Patient discussed on morning rounds with Dr. Carbajal    Operation / Date:  CAD, CABG monday, EF Nl    SUBJECTIVE ASSESSMENT:  53y Male seen and examined. Feels well this morning, chest pain free, denies fever, chest pain, palpitations, dizziness, abdominal pain, n/v.     Vital Signs Last 24 Hrs  T(C): 36.6 (08 Sep 2018 09:00), Max: 36.8 (07 Sep 2018 21:53)  T(F): 97.8 (08 Sep 2018 09:00), Max: 98.2 (07 Sep 2018 21:53)  HR: 92 (08 Sep 2018 08:15) (72 - 100)  BP: 134/87 (08 Sep 2018 08:15) (118/82 - 160/96)  BP(mean): 112 (08 Sep 2018 08:15) (96 - 124)  RR: 14 (08 Sep 2018 08:15) (14 - 18)  SpO2: 98% (08 Sep 2018 08:15) (95% - 100%)  I&O's Detail    07 Sep 2018 07:01  -  08 Sep 2018 07:00  --------------------------------------------------------  IN:  Total IN: 0 mL    OUT:    Voided: 1100 mL  Total OUT: 1100 mL    Total NET: -1100 mL      08 Sep 2018 07:01  -  08 Sep 2018 11:09  --------------------------------------------------------  IN:    Oral Fluid: 240 mL  Total IN: 240 mL    OUT:  Total OUT: 0 mL    Total NET: 240 mL      PHYSICAL EXAM:    General: Patient lying comfortably in bed, no acute distress     Neurological: Alert and oriented. No focal neurological deficits     Cardiovascular: S1S2, RRR, no murmurs appreciated on exam     Respiratory: Clear to ausculation bilaterally, no wheeze/rhonchi/rales    Gastrointestinal: Abdomen soft, non tender, non distended     Extremities: Warm and well perfused. No edema, no calf tenderness     Vascular: 2+ Peripheral pulses b/l     Incision Sites: Incision Sites: R radial cath site: CDI, no hematoma.     LABS:                        14.1   9.5   )-----------( 268      ( 08 Sep 2018 08:37 )             43.1       COUMADIN:  No    PT/INR - ( 07 Sep 2018 06:55 )   PT: 11.6 sec;   INR: 1.04          PTT - ( 07 Sep 2018 06:55 )  PTT:29.8 sec        136  |  98  |  12  ----------------------------<  187<H>  4.7   |  21<L>  |  1.00    Ca    12.9<H>      08 Sep 2018 08:37  Mg     2.1         TPro  6.6  /  Alb  4.1  /  TBili  0.6  /  DBili  x   /  AST  24  /  ALT  24  /  AlkPhos  105        Urinalysis Basic - ( 06 Sep 2018 19:04 )    Color: Yellow / Appearance: Clear / S.010 / pH: x  Gluc: x / Ketone: NEGATIVE  / Bili: Negative / Urobili: 0.2 E.U./dL   Blood: x / Protein: NEGATIVE mg/dL / Nitrite: NEGATIVE   Leuk Esterase: NEGATIVE / RBC: x / WBC x   Sq Epi: x / Non Sq Epi: x / Bacteria: x        MEDICATIONS  (STANDING):  aspirin enteric coated 81 milliGRAM(s) Oral daily  atorvastatin 40 milliGRAM(s) Oral at bedtime  dextrose 5%. 1000 milliLiter(s) (50 mL/Hr) IV Continuous <Continuous>  dextrose 50% Injectable 12.5 Gram(s) IV Push once  dextrose 50% Injectable 25 Gram(s) IV Push once  dextrose 50% Injectable 25 Gram(s) IV Push once  docusate sodium 100 milliGRAM(s) Oral three times a day  heparin  Infusion 800 Unit(s)/Hr (8 mL/Hr) IV Continuous <Continuous>  influenza   Vaccine 0.5 milliLiter(s) IntraMuscular once  insulin lispro (HumaLOG) corrective regimen sliding scale   SubCutaneous Before meals and at bedtime  isosorbide   mononitrate ER Tablet (IMDUR) 30 milliGRAM(s) Oral daily  metoprolol tartrate 25 milliGRAM(s) Oral every 8 hours  pantoprazole    Tablet 40 milliGRAM(s) Oral before breakfast  senna 2 Tablet(s) Oral at bedtime  sodium chloride 0.9% lock flush 3 milliLiter(s) IV Push every 8 hours    MEDICATIONS  (PRN):  acetaminophen   Tablet .. 650 milliGRAM(s) Oral every 4 hours PRN Mild Pain (1 - 3)  dextrose 40% Gel 15 Gram(s) Oral once PRN Blood Glucose LESS THAN 70 milliGRAM(s)/deciliter  glucagon  Injectable 1 milliGRAM(s) IntraMuscular once PRN Glucose LESS THAN 70 milligrams/deciliter  nitroglycerin     SubLingual 0.4 milliGRAM(s) SubLingual every 5 minutes PRN Chest Pain        RADIOLOGY & ADDITIONAL TESTS:  < from: Xray Chest 2 Views PA/Lat (18 @ 21:29) >    FINDINGS: The hilar, mediastinal, and cardiac contours are unremarkable.   No focal consolidation, pneumothorax, or pleural effusions are seen. The   osseous structures are unremarkable.     IMPRESSION: Normal chest x-ray.     < end of copied text >    < from: US Duplex Carotid Arteries Complete, Bilateral (18 @ 21:06) >  IMPRESSION:  No hemodynamically significant stenosis of either carotid arterial system.    < end of copied text >    < from: Echocardiogram (18 @ 08:58) >  The left atrial size is normal. Right atrial size is normal.Structurally   normal   aortic valve. No aortic regurgitation noted. No hemodynamically   significant   valvular aortic stenosis.  Structurally normal mitral valve. There is   trace   mitral regurgitation.Structurally normal tricuspid valve. There is trace   tricuspid regurgitation.There was insufficient TR detected from which to   calculate pulmonary artery systolic pressure.  Structurally normal   pulmonic   valve. There is trace pulmonic regurgitation.The right ventricle is   normal in   size and function.There is mild concentric left ventricular hypertrophy.   The   left ventricular wall motion is normal. The left ventricular ejection   fraction   is 64%. Normal LV diastolic function.  The aortic root measures 3.9 cm at   sinuses (normal less than 4 cm for men, less than 3.6 cm for women).    There is   no pericardial effusion.No prior study for comparison.    < end of copied text >

## 2018-09-08 NOTE — PROGRESS NOTE ADULT - SUBJECTIVE AND OBJECTIVE BOX
CTICU  CRITICAL  CARE  attending     Hand off received 					   Pertinent clinical, laboratory, radiographic, hemodynamic, echocardiographic, respiratory data, microbiologic data and chart were reviewed and analyzed frequently throughout the course of the day and night  Patient seen and examined with CTS/ SH attending at bedside  Pt is a 53y , Male, HEALTH ISSUES - PROBLEM Dx:      , FAMILY HISTORY:  No pertinent family history in first degree relatives  PAST MEDICAL & SURGICAL HISTORY:  Pre-diabetes  HLD (hyperlipidemia)  HTN (hypertension)  History of lumbar spinal fusion    Patient is a 53y old  Male who presents with a chief complaint of CAD (08 Sep 2018 11:09)      14 system review was unremarkable  acute changes include acute respiratory failure  Vital signs, hemodynamic and respiratory parameters were reviewed from the bedside nursing flowsheet.  ICU Vital Signs Last 24 Hrs  T(C): 36.1 (08 Sep 2018 17:06), Max: 37.1 (08 Sep 2018 13:52)  T(F): 97 (08 Sep 2018 17:06), Max: 98.7 (08 Sep 2018 13:52)  HR: 66 (08 Sep 2018 17:20) (62 - 92)  BP: 137/92 (08 Sep 2018 17:20) (127/76 - 157/98)  BP(mean): 112 (08 Sep 2018 17:20) (90 - 119)  ABP: --  ABP(mean): --  RR: 14 (08 Sep 2018 17:20) (14 - 17)  SpO2: 96% (08 Sep 2018 17:20) (95% - 98%)    Adult Advanced Hemodynamics Last 24 Hrs  CVP(mm Hg): --  CVP(cm H2O): --  CO: --  CI: --  PA: --  PA(mean): --  PCWP: --  SVR: --  SVRI: --  PVR: --  PVRI: --,     Intake and output was reviewed and the fluid balance was calculated  Daily     Daily   I&O's Summary    07 Sep 2018 07:01  -  08 Sep 2018 07:00  --------------------------------------------------------  IN: 0 mL / OUT: 1100 mL / NET: -1100 mL    08 Sep 2018 07:01  -  08 Sep 2018 21:01  --------------------------------------------------------  IN: 316 mL / OUT: 1000 mL / NET: -684 mL        All lines and drain sites were assessed  Glycemic trend was reviewedCAPILLARY BLOOD GLUCOSE      POCT Blood Glucose.: 119 mg/dL (08 Sep 2018 16:57)    No acute change in mental status  Auscultation of the chest reveals equal bs  Abdomen is soft  Extremities are warm and well perfused  Wounds appear clean and unremarkable  Antibiotics are periop    labs  CBC Full  -  ( 08 Sep 2018 08:37 )  WBC Count : 9.5 K/uL  Hemoglobin : 14.1 g/dL  Hematocrit : 43.1 %  Platelet Count - Automated : 268 K/uL  Mean Cell Volume : 84.0 fL  Mean Cell Hemoglobin : 27.5 pg  Mean Cell Hemoglobin Concentration : 32.7 g/dL  Auto Neutrophil # : x  Auto Lymphocyte # : x  Auto Monocyte # : x  Auto Eosinophil # : x  Auto Basophil # : x  Auto Neutrophil % : x  Auto Lymphocyte % : x  Auto Monocyte % : x  Auto Eosinophil % : x  Auto Basophil % : x    09-08    136  |  98  |  12  ----------------------------<  187<H>  4.7   |  21<L>  |  1.00    Ca    12.9<H>      08 Sep 2018 08:37  Mg     2.1     09-08      PT/INR - ( 08 Sep 2018 17:10 )   PT: 11.5 sec;   INR: 1.04          PTT - ( 08 Sep 2018 17:10 )  PTT:49.9 sec  The current medications were reviewed   MEDICATIONS  (STANDING):  aspirin enteric coated 81 milliGRAM(s) Oral daily  atorvastatin 40 milliGRAM(s) Oral at bedtime  dextrose 5%. 1000 milliLiter(s) (50 mL/Hr) IV Continuous <Continuous>  dextrose 50% Injectable 12.5 Gram(s) IV Push once  dextrose 50% Injectable 25 Gram(s) IV Push once  dextrose 50% Injectable 25 Gram(s) IV Push once  docusate sodium 100 milliGRAM(s) Oral three times a day  heparin  Infusion 1000 Unit(s)/Hr (10 mL/Hr) IV Continuous <Continuous>  influenza   Vaccine 0.5 milliLiter(s) IntraMuscular once  insulin lispro (HumaLOG) corrective regimen sliding scale   SubCutaneous Before meals and at bedtime  isosorbide   mononitrate ER Tablet (IMDUR) 30 milliGRAM(s) Oral daily  metoprolol tartrate 25 milliGRAM(s) Oral every 8 hours  pantoprazole    Tablet 40 milliGRAM(s) Oral before breakfast  senna 2 Tablet(s) Oral at bedtime  sodium chloride 0.9% lock flush 3 milliLiter(s) IV Push every 8 hours    MEDICATIONS  (PRN):  acetaminophen   Tablet .. 650 milliGRAM(s) Oral every 4 hours PRN Mild Pain (1 - 3)  dextrose 40% Gel 15 Gram(s) Oral once PRN Blood Glucose LESS THAN 70 milliGRAM(s)/deciliter  glucagon  Injectable 1 milliGRAM(s) IntraMuscular once PRN Glucose LESS THAN 70 milligrams/deciliter  nitroglycerin     SubLingual 0.4 milliGRAM(s) SubLingual every 5 minutes PRN Chest Pain       PROBLEM LIST/ ASSESSMENT:  HEALTH ISSUES - PROBLEM Dx:      ,   Patient is a 53y old  Male who presents with a chief complaint of CAD (08 Sep 2018 11:09)           My plan includes :  close hemodynamic, ventilatory and drain monitoring and management per post op routine  crouch for preop  Monitor for arrhythmias and monitor parameters for organ perfusion  monitor neurologic status  Head of the bed should remain elevated to 45 deg .   chest PT and IS will be encouraged  monitor adequacy of oxygenation and ventilation and attempt to wean oxygen  Nutritional goals will be met using po eventually , ensure adequate caloric intake and montior the same  Stress ulcer and VTE prophylaxis will be achieved    Glycemic control is satisfactory  Electrolytes have been repleted as necessary and wound care has been carried out. Pain control has been achieved.   agressive physical therapy and early mobility and ambulation goals will be met   The family was updated about the course and plan  CRITICAL CARE TIME SPENT in evaluation and management, reassessments, review and interpretation of labs and x-rays, ventilator and hemodynamic management, formulating a plan and coordinating care: ___90____ MIN.  Time does not include procedural time.  CTICU ATTENDING     					    Amauri Durham MD

## 2018-09-09 LAB
ANION GAP SERPL CALC-SCNC: 10 MMOL/L — SIGNIFICANT CHANGE UP (ref 5–17)
APTT BLD: 50.4 SEC — HIGH (ref 27.5–37.4)
APTT BLD: 50.8 SEC — HIGH (ref 27.5–37.4)
APTT BLD: 63.6 SEC — HIGH (ref 27.5–37.4)
BLD GP AB SCN SERPL QL: NEGATIVE — SIGNIFICANT CHANGE UP
BUN SERPL-MCNC: 12 MG/DL — SIGNIFICANT CHANGE UP (ref 7–23)
CALCIUM SERPL-MCNC: 12.9 MG/DL — HIGH (ref 8.4–10.5)
CHLORIDE SERPL-SCNC: 101 MMOL/L — SIGNIFICANT CHANGE UP (ref 96–108)
CO2 SERPL-SCNC: 25 MMOL/L — SIGNIFICANT CHANGE UP (ref 22–31)
CREAT SERPL-MCNC: 1.1 MG/DL — SIGNIFICANT CHANGE UP (ref 0.5–1.3)
GLUCOSE BLDC GLUCOMTR-MCNC: 108 MG/DL — HIGH (ref 70–99)
GLUCOSE BLDC GLUCOMTR-MCNC: 120 MG/DL — HIGH (ref 70–99)
GLUCOSE BLDC GLUCOMTR-MCNC: 140 MG/DL — HIGH (ref 70–99)
GLUCOSE BLDC GLUCOMTR-MCNC: 148 MG/DL — HIGH (ref 70–99)
GLUCOSE SERPL-MCNC: 115 MG/DL — HIGH (ref 70–99)
HCT VFR BLD CALC: 41.5 % — SIGNIFICANT CHANGE UP (ref 39–50)
HGB BLD-MCNC: 13.5 G/DL — SIGNIFICANT CHANGE UP (ref 13–17)
INR BLD: 1.09 — SIGNIFICANT CHANGE UP (ref 0.88–1.16)
MAGNESIUM SERPL-MCNC: 2.3 MG/DL — SIGNIFICANT CHANGE UP (ref 1.6–2.6)
MCHC RBC-ENTMCNC: 27.7 PG — SIGNIFICANT CHANGE UP (ref 27–34)
MCHC RBC-ENTMCNC: 32.5 G/DL — SIGNIFICANT CHANGE UP (ref 32–36)
MCV RBC AUTO: 85.2 FL — SIGNIFICANT CHANGE UP (ref 80–100)
PLATELET # BLD AUTO: 249 K/UL — SIGNIFICANT CHANGE UP (ref 150–400)
POTASSIUM SERPL-MCNC: 4.3 MMOL/L — SIGNIFICANT CHANGE UP (ref 3.5–5.3)
POTASSIUM SERPL-SCNC: 4.3 MMOL/L — SIGNIFICANT CHANGE UP (ref 3.5–5.3)
PROTHROM AB SERPL-ACNC: 12.1 SEC — SIGNIFICANT CHANGE UP (ref 9.8–12.7)
RBC # BLD: 4.87 M/UL — SIGNIFICANT CHANGE UP (ref 4.2–5.8)
RBC # FLD: 13.2 % — SIGNIFICANT CHANGE UP (ref 10.3–16.9)
RH IG SCN BLD-IMP: POSITIVE — SIGNIFICANT CHANGE UP
SODIUM SERPL-SCNC: 136 MMOL/L — SIGNIFICANT CHANGE UP (ref 135–145)
WBC # BLD: 8.2 K/UL — SIGNIFICANT CHANGE UP (ref 3.8–10.5)
WBC # FLD AUTO: 8.2 K/UL — SIGNIFICANT CHANGE UP (ref 3.8–10.5)

## 2018-09-09 RX ORDER — CHLORHEXIDINE GLUCONATE 213 G/1000ML
1 SOLUTION TOPICAL ONCE
Qty: 0 | Refills: 0 | Status: COMPLETED | OUTPATIENT
Start: 2018-09-09 | End: 2018-09-09

## 2018-09-09 RX ORDER — CHLORHEXIDINE GLUCONATE 213 G/1000ML
5 SOLUTION TOPICAL ONCE
Qty: 0 | Refills: 0 | Status: COMPLETED | OUTPATIENT
Start: 2018-09-09 | End: 2018-09-10

## 2018-09-09 RX ORDER — CHLORHEXIDINE GLUCONATE 213 G/1000ML
1 SOLUTION TOPICAL ONCE
Qty: 0 | Refills: 0 | Status: COMPLETED | OUTPATIENT
Start: 2018-09-10 | End: 2018-09-10

## 2018-09-09 RX ADMIN — CHLORHEXIDINE GLUCONATE 1 APPLICATION(S): 213 SOLUTION TOPICAL at 20:40

## 2018-09-09 RX ADMIN — Medication 100 MILLIGRAM(S): at 14:23

## 2018-09-09 RX ADMIN — PANTOPRAZOLE SODIUM 40 MILLIGRAM(S): 20 TABLET, DELAYED RELEASE ORAL at 06:19

## 2018-09-09 RX ADMIN — SENNA PLUS 2 TABLET(S): 8.6 TABLET ORAL at 21:52

## 2018-09-09 RX ADMIN — Medication 100 MILLIGRAM(S): at 06:19

## 2018-09-09 RX ADMIN — SODIUM CHLORIDE 3 MILLILITER(S): 9 INJECTION INTRAMUSCULAR; INTRAVENOUS; SUBCUTANEOUS at 13:47

## 2018-09-09 RX ADMIN — Medication 25 MILLIGRAM(S): at 14:23

## 2018-09-09 RX ADMIN — Medication 81 MILLIGRAM(S): at 11:39

## 2018-09-09 RX ADMIN — Medication 1 CAPSULE(S): at 15:01

## 2018-09-09 RX ADMIN — Medication 25 MILLIGRAM(S): at 21:52

## 2018-09-09 RX ADMIN — Medication 1 CAPSULE(S): at 15:29

## 2018-09-09 RX ADMIN — ATORVASTATIN CALCIUM 40 MILLIGRAM(S): 80 TABLET, FILM COATED ORAL at 21:52

## 2018-09-09 RX ADMIN — ISOSORBIDE MONONITRATE 30 MILLIGRAM(S): 60 TABLET, EXTENDED RELEASE ORAL at 11:39

## 2018-09-09 RX ADMIN — CHLORHEXIDINE GLUCONATE 1 APPLICATION(S): 213 SOLUTION TOPICAL at 22:33

## 2018-09-09 RX ADMIN — Medication 100 MILLIGRAM(S): at 21:52

## 2018-09-09 NOTE — PROGRESS NOTE ADULT - SUBJECTIVE AND OBJECTIVE BOX
Planned Date of Surgery:       9/10/18                                                                                                           Surgeon: Dr. Reddy    Procedure: OPCAB    HPI:  This is a 53 year old male, former 2.5 pack year smoker (quit 3 months ago) with history of HTN, HLD, and borderline DM who developed substernal chest pain approximately 3 days ago with progression in severity to 9/5/18 where pain was described as substernal, 10/10, stabbing in nature with radiation to his back and left arm, associated with nausea/vomiting and dizziness which [place] [facility] [place]  [place] does report a similar episode in 2012 with stress test at that time negative and no medical/surgical therapies given at that time.  On arrival to ED, he underwent emergent CTA chest which r/o aortic dissection and subsequently underwent cardiac catheterization which demonstrated 70% mLAD, 90% OM3, 99% ostial Ramus, 100%  proximal Ramus, 99% ostial RCA, and 95% left AV groove artery with EF 75%.  Dr. Reddy, CT Surgery, was consulted and patient was transferred to [facility] on 9/6/18 under his care to complete pre-surgical evaluation.      PAST MEDICAL & SURGICAL HISTORY:  Pre-diabetes  HLD (hyperlipidemia)  HTN (hypertension)  History of lumbar spinal fusion      No Known Allergies      MEDICATIONS  (STANDING):  aspirin enteric coated 81 milliGRAM(s) Oral daily  atorvastatin 40 milliGRAM(s) Oral at bedtime  dextrose 5%. 1000 milliLiter(s) (50 mL/Hr) IV Continuous <Continuous>  dextrose 50% Injectable 12.5 Gram(s) IV Push once  dextrose 50% Injectable 25 Gram(s) IV Push once  dextrose 50% Injectable 25 Gram(s) IV Push once  docusate sodium 100 milliGRAM(s) Oral three times a day  heparin  Infusion 1000 Unit(s)/Hr (10 mL/Hr) IV Continuous <Continuous>  influenza   Vaccine 0.5 milliLiter(s) IntraMuscular once  insulin lispro (HumaLOG) corrective regimen sliding scale   SubCutaneous Before meals and at bedtime  isosorbide   mononitrate ER Tablet (IMDUR) 30 milliGRAM(s) Oral daily  metoprolol tartrate 25 milliGRAM(s) Oral every 8 hours  pantoprazole    Tablet 40 milliGRAM(s) Oral before breakfast  senna 2 Tablet(s) Oral at bedtime  sodium chloride 0.9% lock flush 3 milliLiter(s) IV Push every 8 hours    MEDICATIONS  (PRN):  acetaminophen   Tablet .. 650 milliGRAM(s) Oral every 4 hours PRN Mild Pain (1 - 3)  dextrose 40% Gel 15 Gram(s) Oral once PRN Blood Glucose LESS THAN 70 milliGRAM(s)/deciliter  glucagon  Injectable 1 milliGRAM(s) IntraMuscular once PRN Glucose LESS THAN 70 milligrams/deciliter  nitroglycerin     SubLingual 0.4 milliGRAM(s) SubLingual every 5 minutes PRN Chest Pain      On Beta Blocker? YES    Labs:                        13.5   8.2   )-----------( 249      ( 09 Sep 2018 06:35 )             41.5     09-09    136  |  101  |  12  ----------------------------<  115<H>  4.3   |  25  |  1.10    Ca    12.9<H>      09 Sep 2018 06:35  Mg     2.3     09-09      PT/INR - ( 09 Sep 2018 06:35 )   PT: 12.1 sec;   INR: 1.09          PTT - ( 09 Sep 2018 06:35 )  PTT:63.6 sec    ABO Interpretation: O (09-09-18 @ 05:30)      CARDIAC MARKERS ( 07 Sep 2018 19:31 )  x     / <0.01 ng/mL / x     / x     / x          Hgb A1C: 6.0    EKG:    < from: 12 Lead ECG (09.06.18 @ 16:51) >  Ventricular Rate 70 BPM    Atrial Rate 70 BPM    P-R Interval 158 ms    QRS Duration 80 ms    Q-T Interval 370 ms    QTC Calculation(Bezet) 399 ms    P Axis 7 degrees    R Axis -12 degrees    T Axis 18 degrees    Diagnosis Line Normal sinus rhythm  Inferior infarct , age undetermined    Confirmed by Radha Laureano ([id number]) on 9/9/2018 8:15:21 AM    < end of copied text >    CXR:    < from: Xray Chest 2 Views PA/Lat (09.06.18 @ 21:29) >  FINDINGS: The hilar, mediastinal, and cardiac contours are unremarkable.   No focal consolidation, pneumothorax, or pleural effusions are seen. The   osseous structures are unremarkable.     IMPRESSION: Normal chest x-ray.     < end of copied text >    CT Scans: n/a    Cath Report:    Echo:  < from: Echocardiogram (09.07.18 @ 08:58) >  The left atrial size is normal. Right atrial size is normal.Structurally   normal   aortic valve. No aortic regurgitation noted. No hemodynamically   significant   valvular aortic stenosis.  Structurally normal mitral valve. There is   trace   mitral regurgitation.Structurally normal tricuspid valve. There is trace   tricuspid regurgitation.There was insufficient TR detected from which to   calculate pulmonary artery systolic pressure.  Structurally normal   pulmonic   valve. There is trace pulmonic regurgitation.The right ventricle is   normal in   size and function.There is mild concentric left ventricular hypertrophy.   The   left ventricular wall motion is normal. The left ventricular ejection   fraction   is 64%. Normal LV diastolic function.  The aortic root measures 3.9 cm at   sinuses (normal less than 4 cm for men, less than 3.6 cm for women).    There is   no pericardial effusion.No prior study for comparison.    < end of copied text >      PFT's: FVC: 81%, FEV 89%, FEV/FVC 85%    Carotid Duplex:  < from: US Duplex Carotid Arteries Complete, Bilateral (09.06.18 @ 21:06) >  IMPRESSION:  No hemodynamically significant stenosis of either carotid arterial system.    < end of copied text >      Consult in Chart?  YES   Consent in Chart? YES   Pre-op Orders Placed? YES   Blood Products Ordered? YES   NPO ordered? YES

## 2018-09-10 ENCOUNTER — APPOINTMENT (OUTPATIENT)
Dept: CARDIOTHORACIC SURGERY | Facility: HOSPITAL | Age: 53
End: 2018-09-10
Payer: MEDICAID

## 2018-09-10 DIAGNOSIS — Z09 ENCOUNTER FOR FOLLOW-UP EXAMINATION AFTER COMPLETED TREATMENT FOR CONDITIONS OTHER THAN MALIGNANT NEOPLASM: ICD-10-CM

## 2018-09-10 DIAGNOSIS — Z87.891 PERSONAL HISTORY OF NICOTINE DEPENDENCE: ICD-10-CM

## 2018-09-10 DIAGNOSIS — I25.10 ATHEROSCLEROTIC HEART DISEASE OF NATIVE CORONARY ARTERY W/OUT ANGINA PECTORIS: ICD-10-CM

## 2018-09-10 DIAGNOSIS — Z86.39 PERSONAL HISTORY OF OTHER ENDOCRINE, NUTRITIONAL AND METABOLIC DISEASE: ICD-10-CM

## 2018-09-10 DIAGNOSIS — Z86.79 PERSONAL HISTORY OF OTHER DISEASES OF THE CIRCULATORY SYSTEM: ICD-10-CM

## 2018-09-10 LAB
ALBUMIN SERPL ELPH-MCNC: 3.2 G/DL — LOW (ref 3.3–5)
ALBUMIN SERPL ELPH-MCNC: 4 G/DL — SIGNIFICANT CHANGE UP (ref 3.3–5)
ALBUMIN SERPL ELPH-MCNC: 4.3 G/DL — SIGNIFICANT CHANGE UP (ref 3.3–5)
ALP SERPL-CCNC: 72 U/L — SIGNIFICANT CHANGE UP (ref 40–120)
ALP SERPL-CCNC: 75 U/L — SIGNIFICANT CHANGE UP (ref 40–120)
ALP SERPL-CCNC: 88 U/L — SIGNIFICANT CHANGE UP (ref 40–120)
ALT FLD-CCNC: 19 U/L — SIGNIFICANT CHANGE UP (ref 10–45)
ALT FLD-CCNC: 20 U/L — SIGNIFICANT CHANGE UP (ref 10–45)
ALT FLD-CCNC: 22 U/L — SIGNIFICANT CHANGE UP (ref 10–45)
ANION GAP SERPL CALC-SCNC: 10 MMOL/L — SIGNIFICANT CHANGE UP (ref 5–17)
ANION GAP SERPL CALC-SCNC: 13 MMOL/L — SIGNIFICANT CHANGE UP (ref 5–17)
ANION GAP SERPL CALC-SCNC: 14 MMOL/L — SIGNIFICANT CHANGE UP (ref 5–17)
ANION GAP SERPL CALC-SCNC: 15 MMOL/L — SIGNIFICANT CHANGE UP (ref 5–17)
APTT BLD: 26 SEC — LOW (ref 27.5–37.4)
APTT BLD: 29.7 SEC — SIGNIFICANT CHANGE UP (ref 27.5–37.4)
APTT BLD: 31.2 SEC — SIGNIFICANT CHANGE UP (ref 27.5–37.4)
APTT BLD: 47.7 SEC — HIGH (ref 27.5–37.4)
AST SERPL-CCNC: 20 U/L — SIGNIFICANT CHANGE UP (ref 10–40)
AST SERPL-CCNC: 22 U/L — SIGNIFICANT CHANGE UP (ref 10–40)
AST SERPL-CCNC: 22 U/L — SIGNIFICANT CHANGE UP (ref 10–40)
BASE EXCESS BLDV CALC-SCNC: -0.5 MMOL/L — SIGNIFICANT CHANGE UP
BILIRUB DIRECT SERPL-MCNC: 0.5 MG/DL — HIGH (ref 0–0.2)
BILIRUB INDIRECT FLD-MCNC: 0.8 MG/DL — SIGNIFICANT CHANGE UP (ref 0.2–1)
BILIRUB SERPL-MCNC: 0.7 MG/DL — SIGNIFICANT CHANGE UP (ref 0.2–1.2)
BILIRUB SERPL-MCNC: 1.1 MG/DL — SIGNIFICANT CHANGE UP (ref 0.2–1.2)
BILIRUB SERPL-MCNC: 1.3 MG/DL — HIGH (ref 0.2–1.2)
BUN SERPL-MCNC: 10 MG/DL — SIGNIFICANT CHANGE UP (ref 7–23)
BUN SERPL-MCNC: 13 MG/DL — SIGNIFICANT CHANGE UP (ref 7–23)
BUN SERPL-MCNC: 13 MG/DL — SIGNIFICANT CHANGE UP (ref 7–23)
BUN SERPL-MCNC: 16 MG/DL — SIGNIFICANT CHANGE UP (ref 7–23)
CALCIUM SERPL-MCNC: 11.8 MG/DL — HIGH (ref 8.4–10.5)
CALCIUM SERPL-MCNC: 11.9 MG/DL — HIGH (ref 8.4–10.5)
CALCIUM SERPL-MCNC: 12.5 MG/DL — HIGH (ref 8.4–10.5)
CALCIUM SERPL-MCNC: 14.1 MG/DL — CRITICAL HIGH (ref 8.4–10.5)
CHLORIDE SERPL-SCNC: 101 MMOL/L — SIGNIFICANT CHANGE UP (ref 96–108)
CHLORIDE SERPL-SCNC: 102 MMOL/L — SIGNIFICANT CHANGE UP (ref 96–108)
CHLORIDE SERPL-SCNC: 107 MMOL/L — SIGNIFICANT CHANGE UP (ref 96–108)
CHLORIDE SERPL-SCNC: 107 MMOL/L — SIGNIFICANT CHANGE UP (ref 96–108)
CO2 SERPL-SCNC: 23 MMOL/L — SIGNIFICANT CHANGE UP (ref 22–31)
CREAT SERPL-MCNC: 0.98 MG/DL — SIGNIFICANT CHANGE UP (ref 0.5–1.3)
CREAT SERPL-MCNC: 0.98 MG/DL — SIGNIFICANT CHANGE UP (ref 0.5–1.3)
CREAT SERPL-MCNC: 1.01 MG/DL — SIGNIFICANT CHANGE UP (ref 0.5–1.3)
CREAT SERPL-MCNC: 1.05 MG/DL — SIGNIFICANT CHANGE UP (ref 0.5–1.3)
GAS PNL BLDA: SIGNIFICANT CHANGE UP
GAS PNL BLDV: SIGNIFICANT CHANGE UP
GLUCOSE BLDC GLUCOMTR-MCNC: 120 MG/DL — HIGH (ref 70–99)
GLUCOSE BLDC GLUCOMTR-MCNC: 125 MG/DL — HIGH (ref 70–99)
GLUCOSE BLDC GLUCOMTR-MCNC: 130 MG/DL — HIGH (ref 70–99)
GLUCOSE BLDC GLUCOMTR-MCNC: 131 MG/DL — HIGH (ref 70–99)
GLUCOSE BLDC GLUCOMTR-MCNC: 168 MG/DL — HIGH (ref 70–99)
GLUCOSE BLDC GLUCOMTR-MCNC: 179 MG/DL — HIGH (ref 70–99)
GLUCOSE BLDC GLUCOMTR-MCNC: 91 MG/DL — SIGNIFICANT CHANGE UP (ref 70–99)
GLUCOSE SERPL-MCNC: 130 MG/DL — HIGH (ref 70–99)
GLUCOSE SERPL-MCNC: 132 MG/DL — HIGH (ref 70–99)
GLUCOSE SERPL-MCNC: 167 MG/DL — HIGH (ref 70–99)
GLUCOSE SERPL-MCNC: 191 MG/DL — HIGH (ref 70–99)
HCO3 BLDV-SCNC: 25 MMOL/L — SIGNIFICANT CHANGE UP (ref 20–27)
HCT VFR BLD CALC: 29.8 % — LOW (ref 39–50)
HCT VFR BLD CALC: 30.9 % — LOW (ref 39–50)
HCT VFR BLD CALC: 34.4 % — LOW (ref 39–50)
HCT VFR BLD CALC: 40.7 % — SIGNIFICANT CHANGE UP (ref 39–50)
HGB BLD-MCNC: 10.3 G/DL — LOW (ref 13–17)
HGB BLD-MCNC: 11.1 G/DL — LOW (ref 13–17)
HGB BLD-MCNC: 13.5 G/DL — SIGNIFICANT CHANGE UP (ref 13–17)
HGB BLD-MCNC: 9.8 G/DL — LOW (ref 13–17)
INR BLD: 1.01 — SIGNIFICANT CHANGE UP (ref 0.88–1.16)
INR BLD: 1.15 — SIGNIFICANT CHANGE UP (ref 0.88–1.16)
INR BLD: 1.17 — HIGH (ref 0.88–1.16)
INR BLD: 1.19 — HIGH (ref 0.88–1.16)
LACTATE SERPL-SCNC: 1.9 MMOL/L — SIGNIFICANT CHANGE UP (ref 0.5–2)
LACTATE SERPL-SCNC: 2.9 MMOL/L — HIGH (ref 0.5–2)
LACTATE SERPL-SCNC: 3 MMOL/L — HIGH (ref 0.5–2)
MAGNESIUM SERPL-MCNC: 1.6 MG/DL — SIGNIFICANT CHANGE UP (ref 1.6–2.6)
MAGNESIUM SERPL-MCNC: 1.7 MG/DL — SIGNIFICANT CHANGE UP (ref 1.6–2.6)
MAGNESIUM SERPL-MCNC: 2.2 MG/DL — SIGNIFICANT CHANGE UP (ref 1.6–2.6)
MCHC RBC-ENTMCNC: 27.3 PG — SIGNIFICANT CHANGE UP (ref 27–34)
MCHC RBC-ENTMCNC: 27.7 PG — SIGNIFICANT CHANGE UP (ref 27–34)
MCHC RBC-ENTMCNC: 27.7 PG — SIGNIFICANT CHANGE UP (ref 27–34)
MCHC RBC-ENTMCNC: 28 PG — SIGNIFICANT CHANGE UP (ref 27–34)
MCHC RBC-ENTMCNC: 32.3 G/DL — SIGNIFICANT CHANGE UP (ref 32–36)
MCHC RBC-ENTMCNC: 32.9 G/DL — SIGNIFICANT CHANGE UP (ref 32–36)
MCHC RBC-ENTMCNC: 33.2 G/DL — SIGNIFICANT CHANGE UP (ref 32–36)
MCHC RBC-ENTMCNC: 33.3 G/DL — SIGNIFICANT CHANGE UP (ref 32–36)
MCV RBC AUTO: 83.4 FL — SIGNIFICANT CHANGE UP (ref 80–100)
MCV RBC AUTO: 84 FL — SIGNIFICANT CHANGE UP (ref 80–100)
MCV RBC AUTO: 84.2 FL — SIGNIFICANT CHANGE UP (ref 80–100)
MCV RBC AUTO: 84.7 FL — SIGNIFICANT CHANGE UP (ref 80–100)
PCO2 BLDV: 44 MMHG — SIGNIFICANT CHANGE UP (ref 41–51)
PH BLDV: 7.37 — SIGNIFICANT CHANGE UP (ref 7.32–7.43)
PHOSPHATE SERPL-MCNC: 2 MG/DL — LOW (ref 2.5–4.5)
PHOSPHATE SERPL-MCNC: 2.2 MG/DL — LOW (ref 2.5–4.5)
PLATELET # BLD AUTO: 221 K/UL — SIGNIFICANT CHANGE UP (ref 150–400)
PLATELET # BLD AUTO: 226 K/UL — SIGNIFICANT CHANGE UP (ref 150–400)
PLATELET # BLD AUTO: 238 K/UL — SIGNIFICANT CHANGE UP (ref 150–400)
PLATELET # BLD AUTO: 283 K/UL — SIGNIFICANT CHANGE UP (ref 150–400)
PO2 BLDV: 42 MMHG — SIGNIFICANT CHANGE UP
POTASSIUM SERPL-MCNC: 4.1 MMOL/L — SIGNIFICANT CHANGE UP (ref 3.5–5.3)
POTASSIUM SERPL-MCNC: 4.1 MMOL/L — SIGNIFICANT CHANGE UP (ref 3.5–5.3)
POTASSIUM SERPL-MCNC: 4.3 MMOL/L — SIGNIFICANT CHANGE UP (ref 3.5–5.3)
POTASSIUM SERPL-MCNC: 4.3 MMOL/L — SIGNIFICANT CHANGE UP (ref 3.5–5.3)
POTASSIUM SERPL-SCNC: 4.1 MMOL/L — SIGNIFICANT CHANGE UP (ref 3.5–5.3)
POTASSIUM SERPL-SCNC: 4.1 MMOL/L — SIGNIFICANT CHANGE UP (ref 3.5–5.3)
POTASSIUM SERPL-SCNC: 4.3 MMOL/L — SIGNIFICANT CHANGE UP (ref 3.5–5.3)
POTASSIUM SERPL-SCNC: 4.3 MMOL/L — SIGNIFICANT CHANGE UP (ref 3.5–5.3)
PROT SERPL-MCNC: 5.8 G/DL — LOW (ref 6–8.3)
PROT SERPL-MCNC: 6.2 G/DL — SIGNIFICANT CHANGE UP (ref 6–8.3)
PROT SERPL-MCNC: 6.6 G/DL — SIGNIFICANT CHANGE UP (ref 6–8.3)
PROTHROM AB SERPL-ACNC: 11.2 SEC — SIGNIFICANT CHANGE UP (ref 9.8–12.7)
PROTHROM AB SERPL-ACNC: 12.8 SEC — HIGH (ref 9.8–12.7)
PROTHROM AB SERPL-ACNC: 13 SEC — HIGH (ref 9.8–12.7)
PROTHROM AB SERPL-ACNC: 13.3 SEC — HIGH (ref 9.8–12.7)
RBC # BLD: 3.54 M/UL — LOW (ref 4.2–5.8)
RBC # BLD: 3.68 M/UL — LOW (ref 4.2–5.8)
RBC # BLD: 4.06 M/UL — LOW (ref 4.2–5.8)
RBC # BLD: 4.88 M/UL — SIGNIFICANT CHANGE UP (ref 4.2–5.8)
RBC # FLD: 12.8 % — SIGNIFICANT CHANGE UP (ref 10.3–16.9)
RBC # FLD: 12.8 % — SIGNIFICANT CHANGE UP (ref 10.3–16.9)
RBC # FLD: 12.9 % — SIGNIFICANT CHANGE UP (ref 10.3–16.9)
RBC # FLD: 13.1 % — SIGNIFICANT CHANGE UP (ref 10.3–16.9)
SAO2 % BLDV: 75 % — SIGNIFICANT CHANGE UP
SODIUM SERPL-SCNC: 139 MMOL/L — SIGNIFICANT CHANGE UP (ref 135–145)
SODIUM SERPL-SCNC: 139 MMOL/L — SIGNIFICANT CHANGE UP (ref 135–145)
SODIUM SERPL-SCNC: 140 MMOL/L — SIGNIFICANT CHANGE UP (ref 135–145)
SODIUM SERPL-SCNC: 143 MMOL/L — SIGNIFICANT CHANGE UP (ref 135–145)
WBC # BLD: 10.6 K/UL — HIGH (ref 3.8–10.5)
WBC # BLD: 10.9 K/UL — HIGH (ref 3.8–10.5)
WBC # BLD: 12.4 K/UL — HIGH (ref 3.8–10.5)
WBC # BLD: 8.2 K/UL — SIGNIFICANT CHANGE UP (ref 3.8–10.5)
WBC # FLD AUTO: 10.6 K/UL — HIGH (ref 3.8–10.5)
WBC # FLD AUTO: 10.9 K/UL — HIGH (ref 3.8–10.5)
WBC # FLD AUTO: 12.4 K/UL — HIGH (ref 3.8–10.5)
WBC # FLD AUTO: 8.2 K/UL — SIGNIFICANT CHANGE UP (ref 3.8–10.5)

## 2018-09-10 PROCEDURE — 33534 CABG ARTERIAL TWO: CPT

## 2018-09-10 PROCEDURE — 33517 CABG ARTERY-VEIN SINGLE: CPT | Mod: 80

## 2018-09-10 PROCEDURE — 33517 CABG ARTERY-VEIN SINGLE: CPT

## 2018-09-10 PROCEDURE — 33534 CABG ARTERIAL TWO: CPT | Mod: AS

## 2018-09-10 PROCEDURE — 99291 CRITICAL CARE FIRST HOUR: CPT

## 2018-09-10 PROCEDURE — 71045 X-RAY EXAM CHEST 1 VIEW: CPT | Mod: 26

## 2018-09-10 PROCEDURE — 33508 ENDOSCOPIC VEIN HARVEST: CPT | Mod: AS

## 2018-09-10 PROCEDURE — 76998 US GUIDE INTRAOP: CPT | Mod: 26,59

## 2018-09-10 PROCEDURE — 33534 CABG ARTERIAL TWO: CPT | Mod: 80

## 2018-09-10 PROCEDURE — 93010 ELECTROCARDIOGRAM REPORT: CPT

## 2018-09-10 PROCEDURE — 33517 CABG ARTERY-VEIN SINGLE: CPT | Mod: AS

## 2018-09-10 PROCEDURE — 99292 CRITICAL CARE ADDL 30 MIN: CPT

## 2018-09-10 RX ORDER — NICARDIPINE HYDROCHLORIDE 30 MG/1
5 CAPSULE, EXTENDED RELEASE ORAL
Qty: 40 | Refills: 0 | Status: DISCONTINUED | OUTPATIENT
Start: 2018-09-10 | End: 2018-09-11

## 2018-09-10 RX ORDER — ALBUMIN HUMAN 25 %
250 VIAL (ML) INTRAVENOUS ONCE
Qty: 0 | Refills: 0 | Status: COMPLETED | OUTPATIENT
Start: 2018-09-10 | End: 2018-09-10

## 2018-09-10 RX ORDER — CEFAZOLIN SODIUM 1 G
2000 VIAL (EA) INJECTION EVERY 8 HOURS
Qty: 0 | Refills: 0 | Status: COMPLETED | OUTPATIENT
Start: 2018-09-10 | End: 2018-09-12

## 2018-09-10 RX ORDER — FENTANYL CITRATE 50 UG/ML
25 INJECTION INTRAVENOUS ONCE
Qty: 0 | Refills: 0 | Status: DISCONTINUED | OUTPATIENT
Start: 2018-09-10 | End: 2018-09-10

## 2018-09-10 RX ORDER — HEPARIN SODIUM 5000 [USP'U]/ML
5000 INJECTION INTRAVENOUS; SUBCUTANEOUS EVERY 8 HOURS
Qty: 0 | Refills: 0 | Status: DISCONTINUED | OUTPATIENT
Start: 2018-09-10 | End: 2018-09-14

## 2018-09-10 RX ORDER — SODIUM CHLORIDE 9 MG/ML
1000 INJECTION INTRAMUSCULAR; INTRAVENOUS; SUBCUTANEOUS
Qty: 0 | Refills: 0 | Status: DISCONTINUED | OUTPATIENT
Start: 2018-09-10 | End: 2018-09-10

## 2018-09-10 RX ORDER — INSULIN HUMAN 100 [IU]/ML
1 INJECTION, SOLUTION SUBCUTANEOUS
Qty: 100 | Refills: 0 | Status: DISCONTINUED | OUTPATIENT
Start: 2018-09-10 | End: 2018-09-11

## 2018-09-10 RX ORDER — FENTANYL CITRATE 50 UG/ML
50 INJECTION INTRAVENOUS ONCE
Qty: 0 | Refills: 0 | Status: DISCONTINUED | OUTPATIENT
Start: 2018-09-10 | End: 2018-09-10

## 2018-09-10 RX ORDER — FENTANYL CITRATE 50 UG/ML
25 INJECTION INTRAVENOUS EVERY 4 HOURS
Qty: 0 | Refills: 0 | Status: DISCONTINUED | OUTPATIENT
Start: 2018-09-10 | End: 2018-09-11

## 2018-09-10 RX ORDER — DEXTROSE 50 % IN WATER 50 %
50 SYRINGE (ML) INTRAVENOUS
Qty: 0 | Refills: 0 | Status: DISCONTINUED | OUTPATIENT
Start: 2018-09-10 | End: 2018-09-14

## 2018-09-10 RX ORDER — DEXMEDETOMIDINE HYDROCHLORIDE IN 0.9% SODIUM CHLORIDE 4 UG/ML
0.5 INJECTION INTRAVENOUS
Qty: 200 | Refills: 0 | Status: DISCONTINUED | OUTPATIENT
Start: 2018-09-10 | End: 2018-09-11

## 2018-09-10 RX ORDER — MEPERIDINE HYDROCHLORIDE 50 MG/ML
25 INJECTION INTRAMUSCULAR; INTRAVENOUS; SUBCUTANEOUS ONCE
Qty: 0 | Refills: 0 | Status: DISCONTINUED | OUTPATIENT
Start: 2018-09-10 | End: 2018-09-10

## 2018-09-10 RX ORDER — MAGNESIUM SULFATE 500 MG/ML
2 VIAL (ML) INJECTION ONCE
Qty: 0 | Refills: 0 | Status: COMPLETED | OUTPATIENT
Start: 2018-09-10 | End: 2018-09-10

## 2018-09-10 RX ORDER — ASPIRIN/CALCIUM CARB/MAGNESIUM 324 MG
81 TABLET ORAL DAILY
Qty: 0 | Refills: 0 | Status: DISCONTINUED | OUTPATIENT
Start: 2018-09-10 | End: 2018-09-14

## 2018-09-10 RX ORDER — SODIUM CHLORIDE 9 MG/ML
1000 INJECTION, SOLUTION INTRAVENOUS ONCE
Qty: 0 | Refills: 0 | Status: COMPLETED | OUTPATIENT
Start: 2018-09-10 | End: 2018-09-10

## 2018-09-10 RX ORDER — ACETAMINOPHEN 500 MG
1000 TABLET ORAL ONCE
Qty: 0 | Refills: 0 | Status: COMPLETED | OUTPATIENT
Start: 2018-09-10 | End: 2018-09-11

## 2018-09-10 RX ORDER — POTASSIUM PHOSPHATE, MONOBASIC POTASSIUM PHOSPHATE, DIBASIC 236; 224 MG/ML; MG/ML
15 INJECTION, SOLUTION INTRAVENOUS ONCE
Qty: 0 | Refills: 0 | Status: COMPLETED | OUTPATIENT
Start: 2018-09-10 | End: 2018-09-10

## 2018-09-10 RX ORDER — SODIUM CHLORIDE 9 MG/ML
1000 INJECTION INTRAMUSCULAR; INTRAVENOUS; SUBCUTANEOUS
Qty: 0 | Refills: 0 | Status: DISCONTINUED | OUTPATIENT
Start: 2018-09-10 | End: 2018-09-13

## 2018-09-10 RX ORDER — PANTOPRAZOLE SODIUM 20 MG/1
40 TABLET, DELAYED RELEASE ORAL DAILY
Qty: 0 | Refills: 0 | Status: DISCONTINUED | OUTPATIENT
Start: 2018-09-10 | End: 2018-09-11

## 2018-09-10 RX ORDER — CEFAZOLIN SODIUM 1 G
2000 VIAL (EA) INJECTION EVERY 8 HOURS
Qty: 0 | Refills: 0 | Status: DISCONTINUED | OUTPATIENT
Start: 2018-09-10 | End: 2018-09-10

## 2018-09-10 RX ORDER — CHLORHEXIDINE GLUCONATE 213 G/1000ML
5 SOLUTION TOPICAL EVERY 4 HOURS
Qty: 0 | Refills: 0 | Status: DISCONTINUED | OUTPATIENT
Start: 2018-09-10 | End: 2018-09-11

## 2018-09-10 RX ORDER — ATORVASTATIN CALCIUM 80 MG/1
40 TABLET, FILM COATED ORAL AT BEDTIME
Qty: 0 | Refills: 0 | Status: DISCONTINUED | OUTPATIENT
Start: 2018-09-10 | End: 2018-09-14

## 2018-09-10 RX ORDER — CLOPIDOGREL BISULFATE 75 MG/1
75 TABLET, FILM COATED ORAL DAILY
Qty: 0 | Refills: 0 | Status: DISCONTINUED | OUTPATIENT
Start: 2018-09-10 | End: 2018-09-14

## 2018-09-10 RX ORDER — PROPOFOL 10 MG/ML
5 INJECTION, EMULSION INTRAVENOUS
Qty: 1000 | Refills: 0 | Status: DISCONTINUED | OUTPATIENT
Start: 2018-09-10 | End: 2018-09-11

## 2018-09-10 RX ADMIN — Medication 125 MILLILITER(S): at 21:48

## 2018-09-10 RX ADMIN — Medication 125 MILLILITER(S): at 17:30

## 2018-09-10 RX ADMIN — PROPOFOL 2.08 MICROGRAM(S)/KG/MIN: 10 INJECTION, EMULSION INTRAVENOUS at 19:29

## 2018-09-10 RX ADMIN — SODIUM CHLORIDE 75 MILLILITER(S): 9 INJECTION INTRAMUSCULAR; INTRAVENOUS; SUBCUTANEOUS at 08:11

## 2018-09-10 RX ADMIN — FENTANYL CITRATE 50 MICROGRAM(S): 50 INJECTION INTRAVENOUS at 17:00

## 2018-09-10 RX ADMIN — CHLORHEXIDINE GLUCONATE 1 APPLICATION(S): 213 SOLUTION TOPICAL at 05:43

## 2018-09-10 RX ADMIN — INSULIN HUMAN 1 UNIT(S)/HR: 100 INJECTION, SOLUTION SUBCUTANEOUS at 19:28

## 2018-09-10 RX ADMIN — Medication 125 MILLILITER(S): at 17:00

## 2018-09-10 RX ADMIN — SODIUM CHLORIDE 2000 MILLILITER(S): 9 INJECTION, SOLUTION INTRAVENOUS at 17:00

## 2018-09-10 RX ADMIN — SODIUM CHLORIDE 10 MILLILITER(S): 9 INJECTION INTRAMUSCULAR; INTRAVENOUS; SUBCUTANEOUS at 19:29

## 2018-09-10 RX ADMIN — Medication 125 MILLILITER(S): at 18:00

## 2018-09-10 RX ADMIN — PANTOPRAZOLE SODIUM 40 MILLIGRAM(S): 20 TABLET, DELAYED RELEASE ORAL at 19:32

## 2018-09-10 RX ADMIN — Medication 2000 MILLIGRAM(S): at 21:17

## 2018-09-10 RX ADMIN — Medication 50 GRAM(S): at 23:28

## 2018-09-10 RX ADMIN — Medication 25 MILLIGRAM(S): at 05:42

## 2018-09-10 RX ADMIN — Medication 125 MILLILITER(S): at 21:30

## 2018-09-10 RX ADMIN — Medication 125 MILLILITER(S): at 18:25

## 2018-09-10 RX ADMIN — CHLORHEXIDINE GLUCONATE 5 MILLILITER(S): 213 SOLUTION TOPICAL at 05:43

## 2018-09-10 RX ADMIN — POTASSIUM PHOSPHATE, MONOBASIC POTASSIUM PHOSPHATE, DIBASIC 62.5 MILLIMOLE(S): 236; 224 INJECTION, SOLUTION INTRAVENOUS at 23:44

## 2018-09-10 RX ADMIN — FENTANYL CITRATE 50 MICROGRAM(S): 50 INJECTION INTRAVENOUS at 17:30

## 2018-09-10 RX ADMIN — NICARDIPINE HYDROCHLORIDE 25 MG/HR: 30 CAPSULE, EXTENDED RELEASE ORAL at 19:23

## 2018-09-10 RX ADMIN — FENTANYL CITRATE 25 MICROGRAM(S): 50 INJECTION INTRAVENOUS at 16:45

## 2018-09-10 RX ADMIN — CHLORHEXIDINE GLUCONATE 5 MILLILITER(S): 213 SOLUTION TOPICAL at 22:30

## 2018-09-10 RX ADMIN — DEXMEDETOMIDINE HYDROCHLORIDE IN 0.9% SODIUM CHLORIDE 8.68 MICROGRAM(S)/KG/HR: 4 INJECTION INTRAVENOUS at 19:27

## 2018-09-10 RX ADMIN — CHLORHEXIDINE GLUCONATE 5 MILLILITER(S): 213 SOLUTION TOPICAL at 19:30

## 2018-09-10 RX ADMIN — DEXMEDETOMIDINE HYDROCHLORIDE IN 0.9% SODIUM CHLORIDE 8.68 MICROGRAM(S)/KG/HR: 4 INJECTION INTRAVENOUS at 21:21

## 2018-09-10 RX ADMIN — FENTANYL CITRATE 25 MICROGRAM(S): 50 INJECTION INTRAVENOUS at 16:30

## 2018-09-10 RX ADMIN — PANTOPRAZOLE SODIUM 40 MILLIGRAM(S): 20 TABLET, DELAYED RELEASE ORAL at 05:42

## 2018-09-10 NOTE — PROGRESS NOTE ADULT - SUBJECTIVE AND OBJECTIVE BOX
CTICU  CRITICAL  CARE  attending   : ICU management from 7p-11.30p  Hand off received @ 7p					   Pertinent clinical, laboratory, radiographic, hemodynamic, echocardiographic, respiratory data, microbiologic data and chart were reviewed and analyzed frequently throughout the course of the day and night  Patient seen and examined with CTS/ SH attending at bedside    Pt is a 53y , Male, s/p OPCAB x 3V today; radial art graft;    remains intubated  hypoxemia ; current smoker  diuresed       FAMILY HISTORY:  No pertinent family history in first degree relatives  PAST MEDICAL & SURGICAL HISTORY:  Pre-diabetes  HLD (hyperlipidemia)  HTN (hypertension)  History of lumbar spinal fusion    Patient is a 53y old  Male who presents with a chief complaint of CAD (10 Sep 2018 20:41)      14 system review was unremarkable  acute changes include acute respiratory failure  Vital signs, hemodynamic and respiratory parameters were reviewed from the bedside nursing flowsheet.  ICU Vital Signs Last 24 Hrs  T(C): 36.2 (11 Sep 2018 01:00), Max: 36.7 (10 Sep 2018 05:16)  T(F): 97.1 (11 Sep 2018 01:00), Max: 98 (10 Sep 2018 05:16)  HR: 62 (11 Sep 2018 01:00) (60 - 92)  BP: 135/72 (10 Sep 2018 21:00) (135/72 - 160/101)  BP(mean): 90 (10 Sep 2018 21:00) (90 - 119)  ABP: 134/58 (11 Sep 2018 01:00) (108/50 - 150/64)  ABP(mean): 82 (11 Sep 2018 01:00) (68 - 100)  RR: 14 (11 Sep 2018 01:00) (12 - 23)  SpO2: 100% (11 Sep 2018 01:00) (92% - 100%)    Adult Advanced Hemodynamics Last 24 Hrs  CVP(mm Hg): 6 (11 Sep 2018 01:00) (5 - 18)  CVP(cm H2O): --  CO: --  CI: --  PA: --  PA(mean): --  PCWP: --  SVR: --  SVRI: --  PVR: --  PVRI: --, ABG - ( 10 Sep 2018 20:59 )  pH, Arterial: 7.42  pH, Blood: x     /  pCO2: 38    /  pO2: 101   / HCO3: 24    / Base Excess: -0.5  /  SaO2: 97                Mode: AC/ CMV (Assist Control/ Continuous Mandatory Ventilation)  RR (machine): 12  TV (machine): 500  FiO2: 50  PEEP: 5  ITime: 1  MAP: 8  PIP: 20    Intake and output was reviewed and the fluid balance was calculated  Daily Height in cm: 157.5 (10 Sep 2018 01:48)    Daily   I&O's Summary    09 Sep 2018 07:01  -  10 Sep 2018 07:00  --------------------------------------------------------  IN: 1330 mL / OUT: 1940 mL / NET: -610 mL    10 Sep 2018 07:01  -  11 Sep 2018 01:34  --------------------------------------------------------  IN: 3421.3 mL / OUT: 3430 mL / NET: -8.7 mL        All lines and drain sites were assessed  [facility]      POCT Blood Glucose.: 121 mg/dL (11 Sep 2018 00:45)    No acute change in mental status  Auscultation of the chest reveals equal bs  Abdomen is soft  Extremities are warm and well perfused  Wounds appear clean and unremarkable  Antibiotics are periop    labs  CBC Full  -  ( 10 Sep 2018 21:01 )  WBC Count : 10.6 K/uL  Hemoglobin : 10.3 g/dL  Hematocrit : 30.9 %  Platelet Count - Automated : 238 K/uL  Mean Cell Volume : 84.0 fL  Mean Cell Hemoglobin : 28.0 pg  Mean Cell Hemoglobin Concentration : 33.3 g/dL  Auto Neutrophil # : x  Auto Lymphocyte # : x  Auto Monocyte # : x  Auto Eosinophil # : x  Auto Basophil # : x  Auto Neutrophil % : x  Auto Lymphocyte % : x  Auto Monocyte % : x  Auto Eosinophil % : x  Auto Basophil % : x    09-10    143  |  107  |  10  ----------------------------<  132<H>  4.1   |  23  |  1.01    Ca    12.5<H>      10 Sep 2018 21:01  Phos  2.0     09-10  Mg     1.7     09-10    TPro  6.6  /  Alb  4.0  /  TBili  1.1  /  DBili  x   /  AST  22  /  ALT  19  /  AlkPhos  75  09-10    PT/INR - ( 10 Sep 2018 21:01 )   PT: 12.8 sec;   INR: 1.15          PTT - ( 10 Sep 2018 21:01 )  PTT:29.7 sec  The current medications were reviewed   MEDICATIONS  (STANDING):  acetaminophen  IVPB .. 1000 milliGRAM(s) IV Intermittent once  aspirin enteric coated 81 milliGRAM(s) Oral daily  atorvastatin 40 milliGRAM(s) Oral at bedtime  ceFAZolin  Injectable. 2000 milliGRAM(s) IV Push every 8 hours  chlorhexidine 0.12% Liquid 5 milliLiter(s) Swish and Spit every 4 hours  clopidogrel Tablet 75 milliGRAM(s) Oral daily  dexmedetomidine Infusion 0.5 MICROgram(s)/kG/Hr (8.675 mL/Hr) IV Continuous <Continuous>  dextrose 50% Injectable 50 milliLiter(s) IV Push every 15 minutes  heparin  Injectable 5000 Unit(s) SubCutaneous every 8 hours  insulin Infusion 1 Unit(s)/Hr (1 mL/Hr) IV Continuous <Continuous>  niCARdipine Infusion 5 mG/Hr (25 mL/Hr) IV Continuous <Continuous>  pantoprazole  Injectable 40 milliGRAM(s) IV Push daily  propofol Infusion 5 MICROgram(s)/kG/Min (2.082 mL/Hr) IV Continuous <Continuous>  sodium chloride 0.9%. 1000 milliLiter(s) (10 mL/Hr) IV Continuous <Continuous>    MEDICATIONS  (PRN):  fentaNYL    Injectable 25 MICROGram(s) IV Push every 4 hours PRN Severe Pain (7 - 10)       PROBLEM LIST/ ASSESSMENT:  HEALTH ISSUES - PROBLEM Dx:    Hypoxemia; post op  s/p CAVG  ,   Patient is a 53y old  Male who presents with a chief complaint of CAD (10 Sep 2018 20:41)     s/p CABG       My plan includes :  close hemodynamic, ventilatory and drain monitoring and management per post op routine    Monitor for arrhythmias and monitor parameters for organ perfusion  monitor neurologic status  Head of the bed should remain elevated to 45 deg .   chest PT and IS will be encouraged  monitor adequacy of oxygenation and ventilation and attempt to wean oxygen  Nutritional goals will be met using po eventually , ensure adequate caloric intake and montior the same  Stress ulcer and VTE prophylaxis will be achieved    Glycemic control is satisfactory  Electrolytes have been repleted as necessary and wound care has been carried out. Pain control has been achieved.   agressive physical therapy and early mobility and ambulation goals will be met   The family was updated about the course and plan  CRITICAL CARE TIME SPENT in evaluation and management, reassessments, review and interpretation of labs and x-rays, ventilator and hemodynamic management, formulating a plan and coordinating care: __30___ MIN.  Time does not include procedural time.  CTICU ATTENDING     					    Ovidio Diehl MD

## 2018-09-10 NOTE — PROGRESS NOTE ADULT - SUBJECTIVE AND OBJECTIVE BOX
CTICU  CRITICAL  CARE  attending     Hand off received 					   Pertinent clinical, laboratory, radiographic, hemodynamic, echocardiographic, respiratory data, microbiologic data and chart were reviewed and analyzed frequently throughout the course of the day and night  Patient seen and examined with CTS/ SH attending at bedside  Pt is a 53y , Male, HEALTH ISSUES - PROBLEM Dx:      , FAMILY HISTORY:  No pertinent family history in first degree relatives  PAST MEDICAL & SURGICAL HISTORY:  Pre-diabetes  HLD (hyperlipidemia)  HTN (hypertension)  History of lumbar spinal fusion    Patient is a 53y old  Male who presents with a chief complaint of CAD (09 Sep 2018 12:59)      14 system review was unremarkable  acute changes include acute respiratory failure  Vital signs, hemodynamic and respiratory parameters were reviewed from the bedside nursing flowsheet.  ICU Vital Signs Last 24 Hrs  T(C): 35.9 (10 Sep 2018 17:20), Max: 37.1 (10 Sep 2018 01:00)  T(F): 96.6 (10 Sep 2018 17:20), Max: 98.7 (10 Sep 2018 01:00)  HR: 66 (10 Sep 2018 20:37) (60 - 92)  BP: 148/83 (10 Sep 2018 08:40) (135/83 - 160/101)  BP(mean): 111 (10 Sep 2018 08:40) (100 - 119)  ABP: 124/58 (10 Sep 2018 20:00) (108/50 - 144/78)  ABP(mean): 76 (10 Sep 2018 20:00) (68 - 100)  RR: 16 (10 Sep 2018 20:37) (12 - 23)  SpO2: 100% (10 Sep 2018 20:37) (92% - 100%)    Adult Advanced Hemodynamics Last 24 Hrs  CVP(mm Hg): 6 (10 Sep 2018 20:00) (5 - 18)  CVP(cm H2O): --  CO: --  CI: --  PA: --  PA(mean): --  PCWP: --  SVR: --  SVRI: --  PVR: --  PVRI: --, ABG - ( 10 Sep 2018 18:18 )  pH, Arterial: 7.40  pH, Blood: x     /  pCO2: 39    /  pO2: 88    / HCO3: 24    / Base Excess: -0.5  /  SaO2: 96                Mode: AC/ CMV (Assist Control/ Continuous Mandatory Ventilation)  RR (machine): 12  TV (machine): 500  FiO2: 50  PEEP: 5  ITime: 1  MAP: 9  PIP: 20    Intake and output was reviewed and the fluid balance was calculated  Daily Height in cm: 157.5 (10 Sep 2018 01:48)    Daily   I&O's Summary    09 Sep 2018 07:01  -  10 Sep 2018 07:00  --------------------------------------------------------  IN: 1330 mL / OUT: 1940 mL / NET: -610 mL    10 Sep 2018 07:01  -  10 Sep 2018 20:41  --------------------------------------------------------  IN: 2336 mL / OUT: 1125 mL / NET: 1211 mL        All lines and drain sites were assessed  [facility]      POCT Blood Glucose.: 130 mg/dL (10 Sep 2018 20:14)    No acute change in mental status  Auscultation of the chest reveals equal bs  Abdomen is soft  Extremities are warm and well perfused  Wounds appear clean and unremarkable  Antibiotics are periop    labs  CBC Full  -  ( 10 Sep 2018 18:19 )  WBC Count : 10.9 K/uL  Hemoglobin : 9.8 g/dL  Hematocrit : 29.8 %  Platelet Count - Automated : 226 K/uL  Mean Cell Volume : 84.2 fL  Mean Cell Hemoglobin : 27.7 pg  Mean Cell Hemoglobin Concentration : 32.9 g/dL  Auto Neutrophil # : x  Auto Lymphocyte # : x  Auto Monocyte # : x  Auto Eosinophil # : x  Auto Basophil # : x  Auto Neutrophil % : x  Auto Lymphocyte % : x  Auto Monocyte % : x  Auto Eosinophil % : x  Auto Basophil % : x    09-10    139  |  101  |  13  ----------------------------<  191<H>  4.1   |  23  |  0.98    Ca    11.9<H>      10 Sep 2018 18:19  Phos  2.2     09-10  Mg     1.6     09-10    TPro  6.2  /  Alb  4.3  /  TBili  1.3<H>  /  DBili  0.5<H>  /  AST  22  /  ALT  20  /  AlkPhos  72  09-10    PT/INR - ( 10 Sep 2018 18:19 )   PT: 13.3 sec;   INR: 1.19          PTT - ( 10 Sep 2018 18:19 )  PTT:31.2 sec  The current medications were reviewed   MEDICATIONS  (STANDING):  acetaminophen  IVPB .. 1000 milliGRAM(s) IV Intermittent once  aspirin enteric coated 81 milliGRAM(s) Oral daily  atorvastatin 40 milliGRAM(s) Oral at bedtime  ceFAZolin  Injectable. 2000 milliGRAM(s) IV Push every 8 hours  chlorhexidine 0.12% Liquid 5 milliLiter(s) Swish and Spit every 4 hours  clopidogrel Tablet 75 milliGRAM(s) Oral daily  dexmedetomidine Infusion 0.5 MICROgram(s)/kG/Hr (8.675 mL/Hr) IV Continuous <Continuous>  dextrose 50% Injectable 50 milliLiter(s) IV Push every 15 minutes  heparin  Injectable 5000 Unit(s) SubCutaneous every 8 hours  insulin Infusion 1 Unit(s)/Hr (1 mL/Hr) IV Continuous <Continuous>  niCARdipine Infusion 5 mG/Hr (25 mL/Hr) IV Continuous <Continuous>  pantoprazole  Injectable 40 milliGRAM(s) IV Push daily  propofol Infusion 5 MICROgram(s)/kG/Min (2.082 mL/Hr) IV Continuous <Continuous>  sodium chloride 0.9%. 1000 milliLiter(s) (10 mL/Hr) IV Continuous <Continuous>    MEDICATIONS  (PRN):  fentaNYL    Injectable 25 MICROGram(s) IV Push every 4 hours PRN Severe Pain (7 - 10)       PROBLEM LIST/ ASSESSMENT:  HEALTH ISSUES - PROBLEM Dx:      ,   Patient is a 53y old  Male who presents with a chief complaint of CAD (09 Sep 2018 12:59)     s/p cardiac surgery      My plan includes :  close hemodynamic, ventilatory and drain monitoring and management per post op routine    Monitor for arrhythmias and monitor parameters for organ perfusion  monitor neurologic status  Head of the bed should remain elevated to 45 deg .   chest PT and IS will be encouraged  monitor adequacy of oxygenation and ventilation and attempt to wean oxygen  Nutritional goals will be met using po eventually , ensure adequate caloric intake and montior the same  Stress ulcer and VTE prophylaxis will be achieved    Glycemic control is satisfactory  Electrolytes have been repleted as necessary and wound care has been carried out. Pain control has been achieved.   agressive physical therapy and early mobility and ambulation goals will be met   The family was updated about the course and plan  CRITICAL CARE TIME SPENT in evaluation and management, reassessments, review and interpretation of labs and x-rays, ventilator and hemodynamic management, formulating a plan and coordinating care: ___90____ MIN.  Time does not include procedural time.  CTICU ATTENDING     					    Amauri Durham MD

## 2018-09-10 NOTE — BRIEF OPERATIVE NOTE - PROCEDURE
<<-----Click on this checkbox to enter Procedure CABG  09/10/2018  LIMA-LAD, Radial-OM, SVG-CAMPBELL  Active  MODONOGUE

## 2018-09-11 LAB
ALBUMIN SERPL ELPH-MCNC: 4.3 G/DL — SIGNIFICANT CHANGE UP (ref 3.3–5)
ALBUMIN SERPL ELPH-MCNC: 4.5 G/DL — SIGNIFICANT CHANGE UP (ref 3.3–5)
ALP SERPL-CCNC: 68 U/L — SIGNIFICANT CHANGE UP (ref 40–120)
ALP SERPL-CCNC: 70 U/L — SIGNIFICANT CHANGE UP (ref 40–120)
ALT FLD-CCNC: 17 U/L — SIGNIFICANT CHANGE UP (ref 10–45)
ALT FLD-CCNC: 17 U/L — SIGNIFICANT CHANGE UP (ref 10–45)
ANION GAP SERPL CALC-SCNC: 13 MMOL/L — SIGNIFICANT CHANGE UP (ref 5–17)
ANION GAP SERPL CALC-SCNC: 15 MMOL/L — SIGNIFICANT CHANGE UP (ref 5–17)
APTT BLD: 29.5 SEC — SIGNIFICANT CHANGE UP (ref 27.5–37.4)
APTT BLD: 29.6 SEC — SIGNIFICANT CHANGE UP (ref 27.5–37.4)
APTT BLD: 30.3 SEC — SIGNIFICANT CHANGE UP (ref 27.5–37.4)
AST SERPL-CCNC: 20 U/L — SIGNIFICANT CHANGE UP (ref 10–40)
AST SERPL-CCNC: 21 U/L — SIGNIFICANT CHANGE UP (ref 10–40)
BILIRUB SERPL-MCNC: 0.9 MG/DL — SIGNIFICANT CHANGE UP (ref 0.2–1.2)
BILIRUB SERPL-MCNC: 1.3 MG/DL — HIGH (ref 0.2–1.2)
BUN SERPL-MCNC: 11 MG/DL — SIGNIFICANT CHANGE UP (ref 7–23)
BUN SERPL-MCNC: 12 MG/DL — SIGNIFICANT CHANGE UP (ref 7–23)
CALCIUM SERPL-MCNC: 12.5 MG/DL — HIGH (ref 8.4–10.5)
CALCIUM SERPL-MCNC: 12.6 MG/DL — HIGH (ref 8.4–10.5)
CHLORIDE SERPL-SCNC: 102 MMOL/L — SIGNIFICANT CHANGE UP (ref 96–108)
CHLORIDE SERPL-SCNC: 104 MMOL/L — SIGNIFICANT CHANGE UP (ref 96–108)
CO2 SERPL-SCNC: 24 MMOL/L — SIGNIFICANT CHANGE UP (ref 22–31)
CO2 SERPL-SCNC: 25 MMOL/L — SIGNIFICANT CHANGE UP (ref 22–31)
CREAT SERPL-MCNC: 0.9 MG/DL — SIGNIFICANT CHANGE UP (ref 0.5–1.3)
CREAT SERPL-MCNC: 0.96 MG/DL — SIGNIFICANT CHANGE UP (ref 0.5–1.3)
GAS PNL BLDA: SIGNIFICANT CHANGE UP
GLUCOSE BLDC GLUCOMTR-MCNC: 115 MG/DL — HIGH (ref 70–99)
GLUCOSE BLDC GLUCOMTR-MCNC: 119 MG/DL — HIGH (ref 70–99)
GLUCOSE BLDC GLUCOMTR-MCNC: 121 MG/DL — HIGH (ref 70–99)
GLUCOSE BLDC GLUCOMTR-MCNC: 122 MG/DL — HIGH (ref 70–99)
GLUCOSE BLDC GLUCOMTR-MCNC: 146 MG/DL — HIGH (ref 70–99)
GLUCOSE BLDC GLUCOMTR-MCNC: 188 MG/DL — HIGH (ref 70–99)
GLUCOSE BLDC GLUCOMTR-MCNC: 98 MG/DL — SIGNIFICANT CHANGE UP (ref 70–99)
GLUCOSE SERPL-MCNC: 120 MG/DL — HIGH (ref 70–99)
GLUCOSE SERPL-MCNC: 139 MG/DL — HIGH (ref 70–99)
HCT VFR BLD CALC: 30 % — LOW (ref 39–50)
HCT VFR BLD CALC: 30.9 % — LOW (ref 39–50)
HCT VFR BLD CALC: 32.2 % — LOW (ref 39–50)
HGB BLD-MCNC: 10 G/DL — LOW (ref 13–17)
HGB BLD-MCNC: 10.4 G/DL — LOW (ref 13–17)
HGB BLD-MCNC: 9.8 G/DL — LOW (ref 13–17)
INR BLD: 1.19 — HIGH (ref 0.88–1.16)
INR BLD: 1.24 — HIGH (ref 0.88–1.16)
INR BLD: 1.3 — HIGH (ref 0.88–1.16)
LACTATE SERPL-SCNC: 0.9 MMOL/L — SIGNIFICANT CHANGE UP (ref 0.5–2)
LACTATE SERPL-SCNC: 1.7 MMOL/L — SIGNIFICANT CHANGE UP (ref 0.5–2)
LACTATE SERPL-SCNC: 2 MMOL/L — SIGNIFICANT CHANGE UP (ref 0.5–2)
MAGNESIUM SERPL-MCNC: 2 MG/DL — SIGNIFICANT CHANGE UP (ref 1.6–2.6)
MAGNESIUM SERPL-MCNC: 2.4 MG/DL — SIGNIFICANT CHANGE UP (ref 1.6–2.6)
MCHC RBC-ENTMCNC: 27.6 PG — SIGNIFICANT CHANGE UP (ref 27–34)
MCHC RBC-ENTMCNC: 27.7 PG — SIGNIFICANT CHANGE UP (ref 27–34)
MCHC RBC-ENTMCNC: 28 PG — SIGNIFICANT CHANGE UP (ref 27–34)
MCHC RBC-ENTMCNC: 32.3 G/DL — SIGNIFICANT CHANGE UP (ref 32–36)
MCHC RBC-ENTMCNC: 32.4 G/DL — SIGNIFICANT CHANGE UP (ref 32–36)
MCHC RBC-ENTMCNC: 32.7 G/DL — SIGNIFICANT CHANGE UP (ref 32–36)
MCV RBC AUTO: 84.5 FL — SIGNIFICANT CHANGE UP (ref 80–100)
MCV RBC AUTO: 85.6 FL — SIGNIFICANT CHANGE UP (ref 80–100)
MCV RBC AUTO: 86.6 FL — SIGNIFICANT CHANGE UP (ref 80–100)
PHOSPHATE SERPL-MCNC: 3 MG/DL — SIGNIFICANT CHANGE UP (ref 2.5–4.5)
PHOSPHATE SERPL-MCNC: 3.4 MG/DL — SIGNIFICANT CHANGE UP (ref 2.5–4.5)
PLATELET # BLD AUTO: 231 K/UL — SIGNIFICANT CHANGE UP (ref 150–400)
PLATELET # BLD AUTO: 235 K/UL — SIGNIFICANT CHANGE UP (ref 150–400)
PLATELET # BLD AUTO: 251 K/UL — SIGNIFICANT CHANGE UP (ref 150–400)
POTASSIUM SERPL-MCNC: 3.9 MMOL/L — SIGNIFICANT CHANGE UP (ref 3.5–5.3)
POTASSIUM SERPL-MCNC: 4.1 MMOL/L — SIGNIFICANT CHANGE UP (ref 3.5–5.3)
POTASSIUM SERPL-SCNC: 3.9 MMOL/L — SIGNIFICANT CHANGE UP (ref 3.5–5.3)
POTASSIUM SERPL-SCNC: 4.1 MMOL/L — SIGNIFICANT CHANGE UP (ref 3.5–5.3)
PROT SERPL-MCNC: 6.3 G/DL — SIGNIFICANT CHANGE UP (ref 6–8.3)
PROT SERPL-MCNC: 6.4 G/DL — SIGNIFICANT CHANGE UP (ref 6–8.3)
PROTHROM AB SERPL-ACNC: 13.3 SEC — HIGH (ref 9.8–12.7)
PROTHROM AB SERPL-ACNC: 13.8 SEC — HIGH (ref 9.8–12.7)
PROTHROM AB SERPL-ACNC: 14.5 SEC — HIGH (ref 9.8–12.7)
RBC # BLD: 3.55 M/UL — LOW (ref 4.2–5.8)
RBC # BLD: 3.61 M/UL — LOW (ref 4.2–5.8)
RBC # BLD: 3.72 M/UL — LOW (ref 4.2–5.8)
RBC # FLD: 12.7 % — SIGNIFICANT CHANGE UP (ref 10.3–16.9)
RBC # FLD: 13.2 % — SIGNIFICANT CHANGE UP (ref 10.3–16.9)
RBC # FLD: 13.4 % — SIGNIFICANT CHANGE UP (ref 10.3–16.9)
SODIUM SERPL-SCNC: 141 MMOL/L — SIGNIFICANT CHANGE UP (ref 135–145)
SODIUM SERPL-SCNC: 142 MMOL/L — SIGNIFICANT CHANGE UP (ref 135–145)
WBC # BLD: 11.8 K/UL — HIGH (ref 3.8–10.5)
WBC # BLD: 8.6 K/UL — SIGNIFICANT CHANGE UP (ref 3.8–10.5)
WBC # BLD: 9.7 K/UL — SIGNIFICANT CHANGE UP (ref 3.8–10.5)
WBC # FLD AUTO: 11.8 K/UL — HIGH (ref 3.8–10.5)
WBC # FLD AUTO: 8.6 K/UL — SIGNIFICANT CHANGE UP (ref 3.8–10.5)
WBC # FLD AUTO: 9.7 K/UL — SIGNIFICANT CHANGE UP (ref 3.8–10.5)

## 2018-09-11 PROCEDURE — 99291 CRITICAL CARE FIRST HOUR: CPT

## 2018-09-11 PROCEDURE — 71045 X-RAY EXAM CHEST 1 VIEW: CPT | Mod: 26,76

## 2018-09-11 RX ORDER — DEXTROSE 50 % IN WATER 50 %
12.5 SYRINGE (ML) INTRAVENOUS ONCE
Qty: 0 | Refills: 0 | Status: DISCONTINUED | OUTPATIENT
Start: 2018-09-11 | End: 2018-09-11

## 2018-09-11 RX ORDER — METOPROLOL TARTRATE 50 MG
12.5 TABLET ORAL
Qty: 0 | Refills: 0 | Status: DISCONTINUED | OUTPATIENT
Start: 2018-09-11 | End: 2018-09-11

## 2018-09-11 RX ORDER — FENTANYL CITRATE 50 UG/ML
12.5 INJECTION INTRAVENOUS ONCE
Qty: 0 | Refills: 0 | Status: DISCONTINUED | OUTPATIENT
Start: 2018-09-11 | End: 2018-09-11

## 2018-09-11 RX ORDER — POLYETHYLENE GLYCOL 3350 17 G/17G
17 POWDER, FOR SOLUTION ORAL DAILY
Qty: 0 | Refills: 0 | Status: DISCONTINUED | OUTPATIENT
Start: 2018-09-11 | End: 2018-09-14

## 2018-09-11 RX ORDER — OXYCODONE HYDROCHLORIDE 5 MG/1
10 TABLET ORAL EVERY 6 HOURS
Qty: 0 | Refills: 0 | Status: DISCONTINUED | OUTPATIENT
Start: 2018-09-11 | End: 2018-09-14

## 2018-09-11 RX ORDER — DEXTROSE 50 % IN WATER 50 %
15 SYRINGE (ML) INTRAVENOUS ONCE
Qty: 0 | Refills: 0 | Status: DISCONTINUED | OUTPATIENT
Start: 2018-09-11 | End: 2018-09-14

## 2018-09-11 RX ORDER — POTASSIUM CHLORIDE 20 MEQ
20 PACKET (EA) ORAL ONCE
Qty: 0 | Refills: 0 | Status: COMPLETED | OUTPATIENT
Start: 2018-09-11 | End: 2018-09-11

## 2018-09-11 RX ORDER — ACETAMINOPHEN 500 MG
650 TABLET ORAL EVERY 6 HOURS
Qty: 0 | Refills: 0 | Status: DISCONTINUED | OUTPATIENT
Start: 2018-09-11 | End: 2018-09-14

## 2018-09-11 RX ORDER — CHLORHEXIDINE GLUCONATE 213 G/1000ML
1 SOLUTION TOPICAL DAILY
Qty: 0 | Refills: 0 | Status: DISCONTINUED | OUTPATIENT
Start: 2018-09-11 | End: 2018-09-13

## 2018-09-11 RX ORDER — FENTANYL CITRATE 50 UG/ML
25 INJECTION INTRAVENOUS ONCE
Qty: 0 | Refills: 0 | Status: DISCONTINUED | OUTPATIENT
Start: 2018-09-11 | End: 2018-09-11

## 2018-09-11 RX ORDER — PANTOPRAZOLE SODIUM 20 MG/1
40 TABLET, DELAYED RELEASE ORAL
Qty: 0 | Refills: 0 | Status: DISCONTINUED | OUTPATIENT
Start: 2018-09-11 | End: 2018-09-13

## 2018-09-11 RX ORDER — DEXTROSE 50 % IN WATER 50 %
25 SYRINGE (ML) INTRAVENOUS ONCE
Qty: 0 | Refills: 0 | Status: DISCONTINUED | OUTPATIENT
Start: 2018-09-11 | End: 2018-09-11

## 2018-09-11 RX ORDER — DOCUSATE SODIUM 100 MG
100 CAPSULE ORAL THREE TIMES A DAY
Qty: 0 | Refills: 0 | Status: DISCONTINUED | OUTPATIENT
Start: 2018-09-11 | End: 2018-09-14

## 2018-09-11 RX ORDER — MORPHINE SULFATE 50 MG/1
1 CAPSULE, EXTENDED RELEASE ORAL ONCE
Qty: 0 | Refills: 0 | Status: DISCONTINUED | OUTPATIENT
Start: 2018-09-11 | End: 2018-09-11

## 2018-09-11 RX ORDER — METOPROLOL TARTRATE 50 MG
25 TABLET ORAL EVERY 8 HOURS
Qty: 0 | Refills: 0 | Status: DISCONTINUED | OUTPATIENT
Start: 2018-09-11 | End: 2018-09-14

## 2018-09-11 RX ORDER — FENTANYL CITRATE 50 UG/ML
12.5 INJECTION INTRAVENOUS ONCE
Qty: 0 | Refills: 0 | Status: DISCONTINUED | OUTPATIENT
Start: 2018-09-11 | End: 2018-09-13

## 2018-09-11 RX ORDER — SENNA PLUS 8.6 MG/1
1 TABLET ORAL AT BEDTIME
Qty: 0 | Refills: 0 | Status: DISCONTINUED | OUTPATIENT
Start: 2018-09-11 | End: 2018-09-14

## 2018-09-11 RX ORDER — LABETALOL HCL 100 MG
10 TABLET ORAL ONCE
Qty: 0 | Refills: 0 | Status: COMPLETED | OUTPATIENT
Start: 2018-09-11 | End: 2018-09-11

## 2018-09-11 RX ORDER — INSULIN LISPRO 100/ML
VIAL (ML) SUBCUTANEOUS
Qty: 0 | Refills: 0 | Status: DISCONTINUED | OUTPATIENT
Start: 2018-09-11 | End: 2018-09-14

## 2018-09-11 RX ORDER — OXYCODONE HYDROCHLORIDE 5 MG/1
5 TABLET ORAL EVERY 6 HOURS
Qty: 0 | Refills: 0 | Status: DISCONTINUED | OUTPATIENT
Start: 2018-09-11 | End: 2018-09-14

## 2018-09-11 RX ORDER — GLUCAGON INJECTION, SOLUTION 0.5 MG/.1ML
1 INJECTION, SOLUTION SUBCUTANEOUS ONCE
Qty: 0 | Refills: 0 | Status: DISCONTINUED | OUTPATIENT
Start: 2018-09-11 | End: 2018-09-14

## 2018-09-11 RX ORDER — ONDANSETRON 8 MG/1
4 TABLET, FILM COATED ORAL ONCE
Qty: 0 | Refills: 0 | Status: COMPLETED | OUTPATIENT
Start: 2018-09-11 | End: 2018-09-11

## 2018-09-11 RX ORDER — SODIUM CHLORIDE 9 MG/ML
1000 INJECTION, SOLUTION INTRAVENOUS
Qty: 0 | Refills: 0 | Status: DISCONTINUED | OUTPATIENT
Start: 2018-09-11 | End: 2018-09-14

## 2018-09-11 RX ORDER — DEXTROSE 50 % IN WATER 50 %
25 SYRINGE (ML) INTRAVENOUS ONCE
Qty: 0 | Refills: 0 | Status: DISCONTINUED | OUTPATIENT
Start: 2018-09-11 | End: 2018-09-14

## 2018-09-11 RX ORDER — MAGNESIUM SULFATE 500 MG/ML
2 VIAL (ML) INJECTION ONCE
Qty: 0 | Refills: 0 | Status: COMPLETED | OUTPATIENT
Start: 2018-09-11 | End: 2018-09-11

## 2018-09-11 RX ADMIN — OXYCODONE HYDROCHLORIDE 5 MILLIGRAM(S): 5 TABLET ORAL at 20:05

## 2018-09-11 RX ADMIN — MORPHINE SULFATE 1 MILLIGRAM(S): 50 CAPSULE, EXTENDED RELEASE ORAL at 18:17

## 2018-09-11 RX ADMIN — FENTANYL CITRATE 25 MICROGRAM(S): 50 INJECTION INTRAVENOUS at 06:21

## 2018-09-11 RX ADMIN — FENTANYL CITRATE 25 MICROGRAM(S): 50 INJECTION INTRAVENOUS at 06:05

## 2018-09-11 RX ADMIN — OXYCODONE HYDROCHLORIDE 10 MILLIGRAM(S): 5 TABLET ORAL at 06:35

## 2018-09-11 RX ADMIN — ATORVASTATIN CALCIUM 40 MILLIGRAM(S): 80 TABLET, FILM COATED ORAL at 22:09

## 2018-09-11 RX ADMIN — FENTANYL CITRATE 12.5 MICROGRAM(S): 50 INJECTION INTRAVENOUS at 12:15

## 2018-09-11 RX ADMIN — PANTOPRAZOLE SODIUM 40 MILLIGRAM(S): 20 TABLET, DELAYED RELEASE ORAL at 06:20

## 2018-09-11 RX ADMIN — FENTANYL CITRATE 25 MICROGRAM(S): 50 INJECTION INTRAVENOUS at 01:15

## 2018-09-11 RX ADMIN — Medication 100 MILLIGRAM(S): at 13:14

## 2018-09-11 RX ADMIN — OXYCODONE HYDROCHLORIDE 10 MILLIGRAM(S): 5 TABLET ORAL at 23:34

## 2018-09-11 RX ADMIN — HEPARIN SODIUM 5000 UNIT(S): 5000 INJECTION INTRAVENOUS; SUBCUTANEOUS at 22:07

## 2018-09-11 RX ADMIN — Medication 25 MILLIGRAM(S): at 20:04

## 2018-09-11 RX ADMIN — Medication 2000 MILLIGRAM(S): at 05:30

## 2018-09-11 RX ADMIN — Medication 1000 MILLIGRAM(S): at 04:10

## 2018-09-11 RX ADMIN — Medication 100 MILLIEQUIVALENT(S): at 13:02

## 2018-09-11 RX ADMIN — FENTANYL CITRATE 25 MICROGRAM(S): 50 INJECTION INTRAVENOUS at 01:00

## 2018-09-11 RX ADMIN — FENTANYL CITRATE 25 MICROGRAM(S): 50 INJECTION INTRAVENOUS at 12:15

## 2018-09-11 RX ADMIN — Medication 50 GRAM(S): at 12:44

## 2018-09-11 RX ADMIN — Medication 2: at 22:07

## 2018-09-11 RX ADMIN — DEXMEDETOMIDINE HYDROCHLORIDE IN 0.9% SODIUM CHLORIDE 8.68 MICROGRAM(S)/KG/HR: 4 INJECTION INTRAVENOUS at 01:00

## 2018-09-11 RX ADMIN — FENTANYL CITRATE 12.5 MICROGRAM(S): 50 INJECTION INTRAVENOUS at 12:00

## 2018-09-11 RX ADMIN — FENTANYL CITRATE 25 MICROGRAM(S): 50 INJECTION INTRAVENOUS at 12:30

## 2018-09-11 RX ADMIN — OXYCODONE HYDROCHLORIDE 10 MILLIGRAM(S): 5 TABLET ORAL at 11:30

## 2018-09-11 RX ADMIN — Medication 400 MILLIGRAM(S): at 03:53

## 2018-09-11 RX ADMIN — MORPHINE SULFATE 1 MILLIGRAM(S): 50 CAPSULE, EXTENDED RELEASE ORAL at 18:32

## 2018-09-11 RX ADMIN — Medication 10 MILLIGRAM(S): at 21:50

## 2018-09-11 RX ADMIN — ONDANSETRON 4 MILLIGRAM(S): 8 TABLET, FILM COATED ORAL at 13:13

## 2018-09-11 RX ADMIN — OXYCODONE HYDROCHLORIDE 10 MILLIGRAM(S): 5 TABLET ORAL at 06:20

## 2018-09-11 RX ADMIN — OXYCODONE HYDROCHLORIDE 5 MILLIGRAM(S): 5 TABLET ORAL at 16:24

## 2018-09-11 RX ADMIN — Medication 2000 MILLIGRAM(S): at 13:14

## 2018-09-11 RX ADMIN — Medication 10 MILLIGRAM(S): at 18:17

## 2018-09-11 RX ADMIN — Medication 12.5 MILLIGRAM(S): at 15:29

## 2018-09-11 RX ADMIN — Medication 100 MILLIGRAM(S): at 22:07

## 2018-09-11 RX ADMIN — CLOPIDOGREL BISULFATE 75 MILLIGRAM(S): 75 TABLET, FILM COATED ORAL at 11:07

## 2018-09-11 RX ADMIN — SENNA PLUS 1 TABLET(S): 8.6 TABLET ORAL at 22:09

## 2018-09-11 RX ADMIN — Medication 2000 MILLIGRAM(S): at 22:07

## 2018-09-11 RX ADMIN — OXYCODONE HYDROCHLORIDE 10 MILLIGRAM(S): 5 TABLET ORAL at 12:08

## 2018-09-11 RX ADMIN — CHLORHEXIDINE GLUCONATE 5 MILLILITER(S): 213 SOLUTION TOPICAL at 01:00

## 2018-09-11 RX ADMIN — HEPARIN SODIUM 5000 UNIT(S): 5000 INJECTION INTRAVENOUS; SUBCUTANEOUS at 13:13

## 2018-09-11 RX ADMIN — OXYCODONE HYDROCHLORIDE 5 MILLIGRAM(S): 5 TABLET ORAL at 18:33

## 2018-09-11 RX ADMIN — Medication 81 MILLIGRAM(S): at 11:07

## 2018-09-11 RX ADMIN — HEPARIN SODIUM 5000 UNIT(S): 5000 INJECTION INTRAVENOUS; SUBCUTANEOUS at 05:06

## 2018-09-11 RX ADMIN — Medication 10 MILLIGRAM(S): at 16:56

## 2018-09-11 NOTE — PROGRESS NOTE ADULT - SUBJECTIVE AND OBJECTIVE BOX
INTERVAL HPI/OVERNIGHT EVENTS:    POD#1 OPCAB x 3  EF 60%    54yo male Hx heavy tobacco use, HTN, HLD, Hx lumbar fusion, borderline DM presenting with CP [place] [facility] [place].     CTa Chest (-)aortic dissection  Cath: EF 75%. 70% mLAD, 90% OM3, 99% ostial Ramus, 100%  proximal Ramus, 99% ostial RCA, and 95% left AV groove artery   transferred 9/6 for CTS assessment and intervention    To OR 9/10 -  - no intraop blood products. no infusions on arrival to ICU  given volume overnight - initial LA 3 - normalized 1.7 this am    Extubated 3:30a and now on nasal cannula 3L  small airleak on chest tube overnight reported - chest tubes to water seal this am    No acute events reported    PMHx includs but is not limited to:   Pre-diabetes  HLD   HTN   History of lumbar spinal fusion    ICU Vital Signs Last 24 Hrs  T(C): 36.1 (11 Sep 2018 05:00), Max: 36.7 (10 Sep 2018 15:20)  T(F): 96.9 (11 Sep 2018 05:00), Max: 98 (10 Sep 2018 15:20)  HR: 66 (11 Sep 2018 07:00) (60 - 92) sinus  BP: 102/59 (11 Sep 2018 06:00) (102/59 - 148/83)  BP(mean): 73 (11 Sep 2018 06:00) (73 - 111)  ABP: 112/52 (11 Sep 2018 07:00) (108/48 - 150/64)  ABP(mean): 72 (11 Sep 2018 07:00) (66 - 100)  SpO2: 99% (11 Sep 2018 07:00) (92% - 100%) on 3L NC    Qtts: Insulin qtt    I&O's Summary    10 Sep 2018 07:01  -  11 Sep 2018 07:00  --------------------------------------------------------  IN: 3838.9 mL / OUT: 4245 mL / NET: -406.1 mL    11 Sep 2018 07:01  -  11 Sep 2018 07:56  --------------------------------------------------------  IN: 110 mL / OUT: 57 mL / NET: 53 mL    Physical Exam    Heart  Lungs  Abd  Ext  Chest  Neuro  Skin    LABS:                        9.8    8.6   )-----------( 235      ( 11 Sep 2018 03:30 )             30.0     09-11    142  |  104  |  11  ----------------------------<  120<H>  4.1   |  25  |  0.90    Ca    12.5<H>      11 Sep 2018 03:30  Phos  3.4     09-11  Mg     2.4     09-11    TPro  6.3  /  Alb  4.3  /  TBili  0.9  /  DBili  x   /  AST  20  /  ALT  17  /  AlkPhos  68  09-11    PT/INR - ( 11 Sep 2018 03:30 )   PT: 13.3 sec;   INR: 1.19          PTT - ( 11 Sep 2018 03:30 )  PTT:29.6 sec    ABG - ( 11 Sep 2018 04:07 )  pH, Arterial: 7.41  pH, Blood: x     /  pCO2: 41    /  pO2: 120   / HCO3: 25    / Base Excess: 0.8   /  SaO2: 97                  RADIOLOGY & ADDITIONAL STUDIES:    I have spent/provided stated minutes of critical care time to this patient: INTERVAL HPI/OVERNIGHT EVENTS:    POD#1 OPCAB x 3  EF 60%    52yo male Hx heavy tobacco use, HTN, HLD, Hx lumbar fusion, borderline DM presenting with [facility].     CTa Chest (-)aortic dissection; CE (-)  Cath: EF 75%. 70% mLAD, 90% OM3, 99% ostial Ramus, 100%  proximal Ramus, 99% ostial RCA, and 95% left AV groove artery   transferred 9/6 for CTS assessment and intervention    To OR 9/10 -  - no intraop blood products. no infusions on arrival to ICU  given volume overnight - initial LA 3 - normalized 1.7 this am    Extubated 3:30a and now on nasal cannula 3L  small airleak on chest tube overnight reported - chest tubes to water seal this am    No acute events reported  Patient OOB in chair - reporting incisional pain - pain meds just given - monitor for effect    PMHx includs but is not limited to:   Pre-diabetes  HLD   HTN   History of lumbar spinal fusion    ICU Vital Signs Last 24 Hrs  T(C): 36.1 (11 Sep 2018 05:00), Max: 36.7 (10 Sep 2018 15:20)  T(F): 96.9 (11 Sep 2018 05:00), Max: 98 (10 Sep 2018 15:20)  HR: 66 (11 Sep 2018 07:00) (60 - 92) sinus  BP: 102/59 (11 Sep 2018 06:00) (102/59 - 148/83)  BP(mean): 73 (11 Sep 2018 06:00) (73 - 111)  ABP: 112/52 (11 Sep 2018 07:00) (108/48 - 150/64)  ABP(mean): 72 (11 Sep 2018 07:00) (66 - 100)  SpO2: 99% (11 Sep 2018 07:00) (92% - 100%) on 3L NC    Qtts: Insulin qtt    I&O's Summary    10 Sep 2018 07:01  -  11 Sep 2018 07:00  --------------------------------------------------------  IN: 3838.9 mL / OUT: 4245 mL / NET: -406.1 mL    11 Sep 2018 07:01  -  11 Sep 2018 07:56  --------------------------------------------------------  IN: 110 mL / OUT: 57 mL / NET: 53 mL    Physical Exam    Heart - regular (-)rub/gallop  Lungs - poor inspiratory effort -shallow breathing - no rhonchi/wheeze  Abd -(+)BS but hypoactive; NTND (-)r/r/g  Ext - warm to touch; no cyanosis/clubbing  Chest -op bandage in place  Neuro - alert/oriented and interactive. moving all extremities - non-focal  Skin - no rash    LABS:                        9.8    8.6   )-----------( 235      ( 11 Sep 2018 03:30 )             30.0     09-11    142  |  104  |  11  ----------------------------<  120<H>  4.1   |  25  |  0.90    Ca    12.5<H>      11 Sep 2018 03:30  Phos  3.4     09-11  Mg     2.4     09-11    TPro  6.3  /  Alb  4.3  /  TBili  0.9  /  DBili  x   /  AST  20  /  ALT  17  /  AlkPhos  68  09-11    PT/INR - ( 11 Sep 2018 03:30 )   PT: 13.3 sec;   INR: 1.19     PTT - ( 11 Sep 2018 03:30 )  PTT:29.6 sec    ABG - ( 11 Sep 2018 04:07 ) 7.41/41/120/97    RADIOLOGY & ADDITIONAL STUDIES: reviewed  HgA1c 6.0    Patient with CAD and anginal sxs now POD #1 OPCAB x 3    1. CV  hemodynamically stable  sinus rhyhtm  ASA/Plavix/statin  start low dose Metoprolol - titrate as clinical scenario allows  complete periop Abx prophylaxis    2. Pulm   extubated and passed bedside swallow  on 3L NC - titrate to maintain Sa02 93% or greater  monitor chest tubes - reported small air leak - chest tubes to water seal  plan remove right pleural today and clamp tubes in am  ambulation with staff assistance; incentive spirometry reviewed with patinet    3. Endocrine  glucose intolerance - HgA1c 6  maintain glycemic control - /98  ISS  Diabetic education    4. GI  initiate diet (passed swallow)  GI prophylaxis  bowel regimen    pain management  DVT and GI prophylaxis    d/w patient/staff and CTS    I have spent/provided stated minutes of critical care time to this patient: 60 min

## 2018-09-11 NOTE — PHYSICAL THERAPY INITIAL EVALUATION ADULT - GENERAL OBSERVATIONS, REHAB EVAL
Patient received in chair complaints of incisional pain 8/10 +EKG, a-line, TLC, 3CT, escalante, temp PPM, O2 NC 3L, B SCD. Left as found +lines intact, MAC Bhardwaj aware

## 2018-09-11 NOTE — PHYSICAL THERAPY INITIAL EVALUATION ADULT - PERTINENT HX OF CURRENT PROBLEM, REHAB EVAL
53M who developed substernal chest pain approximately 3 days ago with progression in severity to 9/5/18 where pain was described as substernal, 10/10, stabbing in nature with radiation to his back and left arm, associated with nausea/vomiting and dizziness which prompted his presentation to [facility] [place] [facility].

## 2018-09-11 NOTE — DIETITIAN INITIAL EVALUATION ADULT. - ENERGY NEEDS
Ideal body weight used for calculations as pt >120% of IBW. + per critical care guidelines   ABW 69kg, IBW 53kg, 130% IBW, ht 62", BMI 28   Nutrient needs based on [facility] standards of care for repletion in adults + increased needs for post-op care/ per critical care guidelines   Fluid per team

## 2018-09-11 NOTE — PHYSICAL THERAPY INITIAL EVALUATION ADULT - ADDITIONAL COMMENTS
house, few steps to enter, 1 flight to main bedroom, has straight cane however does not use occasionally, from previous surgery

## 2018-09-11 NOTE — PHYSICAL THERAPY INITIAL EVALUATION ADULT - GAIT DEVIATIONS NOTED, PT EVAL
significant sternal incisional pain limiting DDBE and dec wilver/decreased step length/decreased wilver

## 2018-09-11 NOTE — PHYSICAL THERAPY INITIAL EVALUATION ADULT - CRITERIA FOR SKILLED THERAPEUTIC INTERVENTIONS
anticipated discharge recommendation/impairments found/functional limitations in following categories/therapy frequency/rehab potential

## 2018-09-11 NOTE — DIETITIAN INITIAL EVALUATION ADULT. - OTHER INFO
53M who developed substernal chest pain approximately 3 days ago with progression in severity to 9/5/18 where pain was described as substernal, 10/10, stabbing in nature with radiation to his back and left arm, associated with nausea/vomiting and dizziness which prompted admission. S/p CABG 9/10, no other pertinent medical hx other than borderline DM + CAD. Pt with wife at bedside, fair-poor tolerance to PO as pt continues with pain at this time. Passed bedside swallow, placed on regular diet. No noted n/v/d/c, skin impaired x3 chest tubes noted, HD stable at this time. Discussed low sodium diet with pt/ wife at bedside.

## 2018-09-12 LAB
ALBUMIN SERPL ELPH-MCNC: 4 G/DL — SIGNIFICANT CHANGE UP (ref 3.3–5)
ALP SERPL-CCNC: 74 U/L — SIGNIFICANT CHANGE UP (ref 40–120)
ALP SERPL-CCNC: 75 U/L — SIGNIFICANT CHANGE UP (ref 40–120)
ALP SERPL-CCNC: 77 U/L — SIGNIFICANT CHANGE UP (ref 40–120)
ALT FLD-CCNC: 13 U/L — SIGNIFICANT CHANGE UP (ref 10–45)
ALT FLD-CCNC: 13 U/L — SIGNIFICANT CHANGE UP (ref 10–45)
ALT FLD-CCNC: 14 U/L — SIGNIFICANT CHANGE UP (ref 10–45)
ANION GAP SERPL CALC-SCNC: 12 MMOL/L — SIGNIFICANT CHANGE UP (ref 5–17)
ANION GAP SERPL CALC-SCNC: 12 MMOL/L — SIGNIFICANT CHANGE UP (ref 5–17)
ANION GAP SERPL CALC-SCNC: 13 MMOL/L — SIGNIFICANT CHANGE UP (ref 5–17)
APTT BLD: 31.2 SEC — SIGNIFICANT CHANGE UP (ref 27.5–37.4)
AST SERPL-CCNC: 18 U/L — SIGNIFICANT CHANGE UP (ref 10–40)
AST SERPL-CCNC: 18 U/L — SIGNIFICANT CHANGE UP (ref 10–40)
AST SERPL-CCNC: 19 U/L — SIGNIFICANT CHANGE UP (ref 10–40)
BASE EXCESS BLDV CALC-SCNC: 3.9 MMOL/L — SIGNIFICANT CHANGE UP
BILIRUB DIRECT SERPL-MCNC: 0.3 MG/DL — HIGH (ref 0–0.2)
BILIRUB INDIRECT FLD-MCNC: 0.9 MG/DL — SIGNIFICANT CHANGE UP (ref 0.2–1)
BILIRUB SERPL-MCNC: 1 MG/DL — SIGNIFICANT CHANGE UP (ref 0.2–1.2)
BILIRUB SERPL-MCNC: 1.2 MG/DL — SIGNIFICANT CHANGE UP (ref 0.2–1.2)
BILIRUB SERPL-MCNC: 1.2 MG/DL — SIGNIFICANT CHANGE UP (ref 0.2–1.2)
BUN SERPL-MCNC: 10 MG/DL — SIGNIFICANT CHANGE UP (ref 7–23)
BUN SERPL-MCNC: 10 MG/DL — SIGNIFICANT CHANGE UP (ref 7–23)
BUN SERPL-MCNC: 12 MG/DL — SIGNIFICANT CHANGE UP (ref 7–23)
CALCIUM SERPL-MCNC: 12.8 MG/DL — HIGH (ref 8.4–10.5)
CALCIUM SERPL-MCNC: 12.9 MG/DL — HIGH (ref 8.4–10.5)
CALCIUM SERPL-MCNC: 13 MG/DL — CRITICAL HIGH (ref 8.4–10.5)
CHLORIDE SERPL-SCNC: 100 MMOL/L — SIGNIFICANT CHANGE UP (ref 96–108)
CHLORIDE SERPL-SCNC: 100 MMOL/L — SIGNIFICANT CHANGE UP (ref 96–108)
CHLORIDE SERPL-SCNC: 101 MMOL/L — SIGNIFICANT CHANGE UP (ref 96–108)
CO2 SERPL-SCNC: 23 MMOL/L — SIGNIFICANT CHANGE UP (ref 22–31)
CO2 SERPL-SCNC: 24 MMOL/L — SIGNIFICANT CHANGE UP (ref 22–31)
CO2 SERPL-SCNC: 26 MMOL/L — SIGNIFICANT CHANGE UP (ref 22–31)
CREAT SERPL-MCNC: 1.14 MG/DL — SIGNIFICANT CHANGE UP (ref 0.5–1.3)
CREAT SERPL-MCNC: 1.15 MG/DL — SIGNIFICANT CHANGE UP (ref 0.5–1.3)
CREAT SERPL-MCNC: 1.18 MG/DL — SIGNIFICANT CHANGE UP (ref 0.5–1.3)
GAS PNL BLDA: SIGNIFICANT CHANGE UP
GAS PNL BLDV: SIGNIFICANT CHANGE UP
GLUCOSE BLDC GLUCOMTR-MCNC: 115 MG/DL — HIGH (ref 70–99)
GLUCOSE BLDC GLUCOMTR-MCNC: 120 MG/DL — HIGH (ref 70–99)
GLUCOSE BLDC GLUCOMTR-MCNC: 177 MG/DL — HIGH (ref 70–99)
GLUCOSE BLDC GLUCOMTR-MCNC: 187 MG/DL — HIGH (ref 70–99)
GLUCOSE SERPL-MCNC: 129 MG/DL — HIGH (ref 70–99)
GLUCOSE SERPL-MCNC: 168 MG/DL — HIGH (ref 70–99)
GLUCOSE SERPL-MCNC: 206 MG/DL — HIGH (ref 70–99)
HCO3 BLDV-SCNC: 29 MMOL/L — HIGH (ref 20–27)
HCT VFR BLD CALC: 31.4 % — LOW (ref 39–50)
HCT VFR BLD CALC: 32.8 % — LOW (ref 39–50)
HGB BLD-MCNC: 10.1 G/DL — LOW (ref 13–17)
HGB BLD-MCNC: 10.5 G/DL — LOW (ref 13–17)
INR BLD: 1.31 — HIGH (ref 0.88–1.16)
LACTATE SERPL-SCNC: 0.9 MMOL/L — SIGNIFICANT CHANGE UP (ref 0.5–2)
LACTATE SERPL-SCNC: 1 MMOL/L — SIGNIFICANT CHANGE UP (ref 0.5–2)
MAGNESIUM SERPL-MCNC: 2.1 MG/DL — SIGNIFICANT CHANGE UP (ref 1.6–2.6)
MAGNESIUM SERPL-MCNC: 2.2 MG/DL — SIGNIFICANT CHANGE UP (ref 1.6–2.6)
MCHC RBC-ENTMCNC: 27.6 PG — SIGNIFICANT CHANGE UP (ref 27–34)
MCHC RBC-ENTMCNC: 27.7 PG — SIGNIFICANT CHANGE UP (ref 27–34)
MCHC RBC-ENTMCNC: 32 G/DL — SIGNIFICANT CHANGE UP (ref 32–36)
MCHC RBC-ENTMCNC: 32.2 G/DL — SIGNIFICANT CHANGE UP (ref 32–36)
MCV RBC AUTO: 86.1 FL — SIGNIFICANT CHANGE UP (ref 80–100)
MCV RBC AUTO: 86.3 FL — SIGNIFICANT CHANGE UP (ref 80–100)
PCO2 BLDV: 44 MMHG — SIGNIFICANT CHANGE UP (ref 41–51)
PH BLDV: 7.43 — SIGNIFICANT CHANGE UP (ref 7.32–7.43)
PHOSPHATE SERPL-MCNC: 1.8 MG/DL — LOW (ref 2.5–4.5)
PHOSPHATE SERPL-MCNC: 2.9 MG/DL — SIGNIFICANT CHANGE UP (ref 2.5–4.5)
PLATELET # BLD AUTO: 242 K/UL — SIGNIFICANT CHANGE UP (ref 150–400)
PLATELET # BLD AUTO: 245 K/UL — SIGNIFICANT CHANGE UP (ref 150–400)
PO2 BLDV: 33 MMHG — SIGNIFICANT CHANGE UP
POTASSIUM SERPL-MCNC: 3.9 MMOL/L — SIGNIFICANT CHANGE UP (ref 3.5–5.3)
POTASSIUM SERPL-MCNC: 4 MMOL/L — SIGNIFICANT CHANGE UP (ref 3.5–5.3)
POTASSIUM SERPL-MCNC: 4.1 MMOL/L — SIGNIFICANT CHANGE UP (ref 3.5–5.3)
POTASSIUM SERPL-SCNC: 3.9 MMOL/L — SIGNIFICANT CHANGE UP (ref 3.5–5.3)
POTASSIUM SERPL-SCNC: 4 MMOL/L — SIGNIFICANT CHANGE UP (ref 3.5–5.3)
POTASSIUM SERPL-SCNC: 4.1 MMOL/L — SIGNIFICANT CHANGE UP (ref 3.5–5.3)
PROT SERPL-MCNC: 6.7 G/DL — SIGNIFICANT CHANGE UP (ref 6–8.3)
PROT SERPL-MCNC: 7.1 G/DL — SIGNIFICANT CHANGE UP (ref 6–8.3)
PROT SERPL-MCNC: 7.1 G/DL — SIGNIFICANT CHANGE UP (ref 6–8.3)
PROTHROM AB SERPL-ACNC: 14.6 SEC — HIGH (ref 9.8–12.7)
RBC # BLD: 3.64 M/UL — LOW (ref 4.2–5.8)
RBC # BLD: 3.81 M/UL — LOW (ref 4.2–5.8)
RBC # FLD: 13.3 % — SIGNIFICANT CHANGE UP (ref 10.3–16.9)
RBC # FLD: 13.4 % — SIGNIFICANT CHANGE UP (ref 10.3–16.9)
SAO2 % BLDV: 62 % — SIGNIFICANT CHANGE UP
SODIUM SERPL-SCNC: 136 MMOL/L — SIGNIFICANT CHANGE UP (ref 135–145)
SODIUM SERPL-SCNC: 137 MMOL/L — SIGNIFICANT CHANGE UP (ref 135–145)
SODIUM SERPL-SCNC: 138 MMOL/L — SIGNIFICANT CHANGE UP (ref 135–145)
WBC # BLD: 11.1 K/UL — HIGH (ref 3.8–10.5)
WBC # BLD: 11.2 K/UL — HIGH (ref 3.8–10.5)
WBC # FLD AUTO: 11.1 K/UL — HIGH (ref 3.8–10.5)
WBC # FLD AUTO: 11.2 K/UL — HIGH (ref 3.8–10.5)

## 2018-09-12 PROCEDURE — 71045 X-RAY EXAM CHEST 1 VIEW: CPT | Mod: 26

## 2018-09-12 PROCEDURE — 99291 CRITICAL CARE FIRST HOUR: CPT

## 2018-09-12 RX ORDER — POTASSIUM PHOSPHATE, MONOBASIC POTASSIUM PHOSPHATE, DIBASIC 236; 224 MG/ML; MG/ML
30 INJECTION, SOLUTION INTRAVENOUS ONCE
Qty: 0 | Refills: 0 | Status: COMPLETED | OUTPATIENT
Start: 2018-09-12 | End: 2018-09-12

## 2018-09-12 RX ORDER — FUROSEMIDE 40 MG
20 TABLET ORAL ONCE
Qty: 0 | Refills: 0 | Status: COMPLETED | OUTPATIENT
Start: 2018-09-12 | End: 2018-09-12

## 2018-09-12 RX ORDER — POTASSIUM CHLORIDE 20 MEQ
10 PACKET (EA) ORAL ONCE
Qty: 0 | Refills: 0 | Status: COMPLETED | OUTPATIENT
Start: 2018-09-12 | End: 2018-09-12

## 2018-09-12 RX ADMIN — OXYCODONE HYDROCHLORIDE 10 MILLIGRAM(S): 5 TABLET ORAL at 00:00

## 2018-09-12 RX ADMIN — Medication 650 MILLIGRAM(S): at 08:19

## 2018-09-12 RX ADMIN — CHLORHEXIDINE GLUCONATE 1 APPLICATION(S): 213 SOLUTION TOPICAL at 05:36

## 2018-09-12 RX ADMIN — POTASSIUM PHOSPHATE, MONOBASIC POTASSIUM PHOSPHATE, DIBASIC 85 MILLIMOLE(S): 236; 224 INJECTION, SOLUTION INTRAVENOUS at 01:21

## 2018-09-12 RX ADMIN — OXYCODONE HYDROCHLORIDE 5 MILLIGRAM(S): 5 TABLET ORAL at 13:55

## 2018-09-12 RX ADMIN — POLYETHYLENE GLYCOL 3350 17 GRAM(S): 17 POWDER, FOR SOLUTION ORAL at 05:36

## 2018-09-12 RX ADMIN — Medication 25 MILLIGRAM(S): at 05:35

## 2018-09-12 RX ADMIN — Medication 25 MILLIGRAM(S): at 13:54

## 2018-09-12 RX ADMIN — OXYCODONE HYDROCHLORIDE 10 MILLIGRAM(S): 5 TABLET ORAL at 21:45

## 2018-09-12 RX ADMIN — Medication 100 MILLIGRAM(S): at 13:54

## 2018-09-12 RX ADMIN — OXYCODONE HYDROCHLORIDE 5 MILLIGRAM(S): 5 TABLET ORAL at 08:19

## 2018-09-12 RX ADMIN — Medication 2: at 11:59

## 2018-09-12 RX ADMIN — OXYCODONE HYDROCHLORIDE 10 MILLIGRAM(S): 5 TABLET ORAL at 21:01

## 2018-09-12 RX ADMIN — OXYCODONE HYDROCHLORIDE 10 MILLIGRAM(S): 5 TABLET ORAL at 06:15

## 2018-09-12 RX ADMIN — Medication 20 MILLIGRAM(S): at 03:04

## 2018-09-12 RX ADMIN — CHLORHEXIDINE GLUCONATE 1 APPLICATION(S): 213 SOLUTION TOPICAL at 23:34

## 2018-09-12 RX ADMIN — OXYCODONE HYDROCHLORIDE 10 MILLIGRAM(S): 5 TABLET ORAL at 05:56

## 2018-09-12 RX ADMIN — CLOPIDOGREL BISULFATE 75 MILLIGRAM(S): 75 TABLET, FILM COATED ORAL at 12:00

## 2018-09-12 RX ADMIN — Medication 50 MILLIEQUIVALENT(S): at 15:36

## 2018-09-12 RX ADMIN — Medication 100 MILLIGRAM(S): at 05:34

## 2018-09-12 RX ADMIN — POLYETHYLENE GLYCOL 3350 17 GRAM(S): 17 POWDER, FOR SOLUTION ORAL at 12:00

## 2018-09-12 RX ADMIN — Medication 100 MILLIGRAM(S): at 21:01

## 2018-09-12 RX ADMIN — Medication 2: at 07:17

## 2018-09-12 RX ADMIN — OXYCODONE HYDROCHLORIDE 10 MILLIGRAM(S): 5 TABLET ORAL at 15:35

## 2018-09-12 RX ADMIN — SENNA PLUS 1 TABLET(S): 8.6 TABLET ORAL at 21:01

## 2018-09-12 RX ADMIN — HEPARIN SODIUM 5000 UNIT(S): 5000 INJECTION INTRAVENOUS; SUBCUTANEOUS at 05:34

## 2018-09-12 RX ADMIN — Medication 25 MILLIGRAM(S): at 21:01

## 2018-09-12 RX ADMIN — Medication 2000 MILLIGRAM(S): at 05:34

## 2018-09-12 RX ADMIN — Medication 81 MILLIGRAM(S): at 12:00

## 2018-09-12 RX ADMIN — ATORVASTATIN CALCIUM 40 MILLIGRAM(S): 80 TABLET, FILM COATED ORAL at 21:01

## 2018-09-12 RX ADMIN — HEPARIN SODIUM 5000 UNIT(S): 5000 INJECTION INTRAVENOUS; SUBCUTANEOUS at 13:54

## 2018-09-12 RX ADMIN — PANTOPRAZOLE SODIUM 40 MILLIGRAM(S): 20 TABLET, DELAYED RELEASE ORAL at 06:04

## 2018-09-12 RX ADMIN — HEPARIN SODIUM 5000 UNIT(S): 5000 INJECTION INTRAVENOUS; SUBCUTANEOUS at 21:00

## 2018-09-12 NOTE — PROGRESS NOTE ADULT - SUBJECTIVE AND OBJECTIVE BOX
CTICU  CRITICAL  CARE  attending     Hand off received 					   Pertinent clinical, laboratory, radiographic, hemodynamic, echocardiographic, respiratory data, microbiologic data and chart were reviewed and analyzed frequently throughout the course of the day and night  Patient seen and examined with CTS/ SH attending at bedside  Pt is a 53y , Male, HEALTH ISSUES - PROBLEM Dx:      , FAMILY HISTORY:  No pertinent family history in first degree relatives  PAST MEDICAL & SURGICAL HISTORY:  Pre-diabetes  HLD (hyperlipidemia)  HTN (hypertension)  History of lumbar spinal fusion    Patient is a 53y old  Male who presents with a chief complaint of CAD - s/p OPCAB x 3 (11 Sep 2018 07:55)      14 system review was unremarkable  acute changes include acute respiratory failure  Vital signs, hemodynamic and respiratory parameters were reviewed from the bedside nursing flowsheet.  ICU Vital Signs Last 24 Hrs  T(C): 36.4 (12 Sep 2018 05:01), Max: 37.7 (11 Sep 2018 17:10)  T(F): 97.6 (12 Sep 2018 05:01), Max: 99.8 (11 Sep 2018 17:10)  HR: 90 (12 Sep 2018 08:00) (76 - 102)  BP: 160/85 (12 Sep 2018 02:00) (153/78 - 172/83)  BP(mean): 110 (12 Sep 2018 02:00) (101 - 112)  ABP: 136/66 (12 Sep 2018 08:00) (114/28 - 216/84)  ABP(mean): 88 (12 Sep 2018 08:00) (52 - 118)  RR: 17 (12 Sep 2018 08:00) (7 - 36)  SpO2: 99% (12 Sep 2018 08:00) (95% - 100%)    Adult Advanced Hemodynamics Last 24 Hrs  CVP(mm Hg): 1 (12 Sep 2018 08:00) (-8 - 12)  CVP(cm H2O): --  CO: --  CI: --  PA: --  PA(mean): --  PCWP: --  SVR: --  SVRI: --  PVR: --  PVRI: --, ABG - ( 12 Sep 2018 03:12 )  pH, Arterial: 7.45  pH, Blood: x     /  pCO2: 38    /  pO2: 81    / HCO3: 26    / Base Excess: 2.3   /  SaO2: 96                  Intake and output was reviewed and the fluid balance was calculated  Daily     Daily Weight in k (11 Sep 2018 16:28)  I&O's Summary    11 Sep 2018 07:  -  12 Sep 2018 07:00  --------------------------------------------------------  IN: 460 mL / OUT: 4460 mL / NET: -4000 mL    12 Sep 2018 07:  -  12 Sep 2018 08:42  --------------------------------------------------------  IN: 0 mL / OUT: 125 mL / NET: -125 mL        All lines and drain sites were assessed  Glycemic trend was reviewedCAPILLARY BLOOD GLUCOSE      POCT Blood Glucose.: 187 mg/dL (12 Sep 2018 07:04)    No acute change in mental status  Auscultation of the chest reveals equal bs  Abdomen is soft  Extremities are warm and well perfused  Wounds appear clean and unremarkable  Antibiotics are periop    labs  CBC Full  -  ( 12 Sep 2018 03:18 )  WBC Count : 11.2 K/uL  Hemoglobin : 10.1 g/dL  Hematocrit : 31.4 %  Platelet Count - Automated : 245 K/uL  Mean Cell Volume : 86.3 fL  Mean Cell Hemoglobin : 27.7 pg  Mean Cell Hemoglobin Concentration : 32.2 g/dL  Auto Neutrophil # : x  Auto Lymphocyte # : x  Auto Monocyte # : x  Auto Eosinophil # : x  Auto Basophil # : x  Auto Neutrophil % : x  Auto Lymphocyte % : x  Auto Monocyte % : x  Auto Eosinophil % : x  Auto Basophil % : x        136  |  100  |  10  ----------------------------<  168<H>  4.1   |  24  |  1.15    Ca    12.8<H>      12 Sep 2018 03:18  Phos  2.9       Mg     2.1         TPro  6.7  /  Alb  4.0  /  TBili  1.2  /  DBili  x   /  AST  19  /  ALT  13  /  AlkPhos  75  12    PT/INR - ( 12 Sep 2018 03:18 )   PT: 14.6 sec;   INR: 1.31          PTT - ( 12 Sep 2018 03:18 )  PTT:31.2 sec  The current medications were reviewed   MEDICATIONS  (STANDING):  aspirin enteric coated 81 milliGRAM(s) Oral daily  atorvastatin 40 milliGRAM(s) Oral at bedtime  chlorhexidine 2% Cloths 1 Application(s) Topical daily  clopidogrel Tablet 75 milliGRAM(s) Oral daily  dextrose 5%. 1000 milliLiter(s) (50 mL/Hr) IV Continuous <Continuous>  dextrose 50% Injectable 25 Gram(s) IV Push once  dextrose 50% Injectable 50 milliLiter(s) IV Push every 15 minutes  docusate sodium 100 milliGRAM(s) Oral three times a day  fentaNYL    Injectable 12.5 MICROGram(s) IV Push once  heparin  Injectable 5000 Unit(s) SubCutaneous every 8 hours  insulin lispro (HumaLOG) corrective regimen sliding scale   SubCutaneous Before meals and at bedtime  metoprolol tartrate 25 milliGRAM(s) Oral every 8 hours  pantoprazole    Tablet 40 milliGRAM(s) Oral before breakfast  polyethylene glycol 3350 17 Gram(s) Oral daily  senna 1 Tablet(s) Oral at bedtime  sodium chloride 0.9%. 1000 milliLiter(s) (10 mL/Hr) IV Continuous <Continuous>    MEDICATIONS  (PRN):  acetaminophen   Tablet .. 650 milliGRAM(s) Oral every 6 hours PRN Mild Pain (1 - 3)  dextrose 40% Gel 15 Gram(s) Oral once PRN Blood Glucose LESS THAN 70 milliGRAM(s)/deciliter  glucagon  Injectable 1 milliGRAM(s) IntraMuscular once PRN Glucose LESS THAN 70 milligrams/deciliter  oxyCODONE    IR 5 milliGRAM(s) Oral every 6 hours PRN Moderate Pain (4 - 6)  oxyCODONE    IR 10 milliGRAM(s) Oral every 6 hours PRN Severe Pain (7 - 10)       PROBLEM LIST/ ASSESSMENT:  HEALTH ISSUES - PROBLEM Dx:      ,   Patient is a 53y old  Male who presents with a chief complaint of CAD - s/p OPCAB x 3 (11 Sep 2018 07:55)     s/p cardiac surgery      My plan includes :  close hemodynamic, ventilatory and drain monitoring and management per post op routine    Monitor for arrhythmias and monitor parameters for organ perfusion  monitor neurologic status  Head of the bed should remain elevated to 45 deg .   chest PT and IS will be encouraged  monitor adequacy of oxygenation and ventilation and attempt to wean oxygen  Nutritional goals will be met using po eventually , ensure adequate caloric intake and montior the same  Stress ulcer and VTE prophylaxis will be achieved    Glycemic control is satisfactory  Electrolytes have been repleted as necessary and wound care has been carried out. Pain control has been achieved.   agressive physical therapy and early mobility and ambulation goals will be met   The family was updated about the course and plan  CRITICAL CARE TIME SPENT in evaluation and management, reassessments, review and interpretation of labs and x-rays, ventilator and hemodynamic management, formulating a plan and coordinating care: ___90____ MIN.  Time does not include procedural time.  CTICU ATTENDING     					    Amauri Durham MD

## 2018-09-13 LAB
ALBUMIN SERPL ELPH-MCNC: 3.9 G/DL — SIGNIFICANT CHANGE UP (ref 3.3–5)
ALP SERPL-CCNC: 82 U/L — SIGNIFICANT CHANGE UP (ref 40–120)
ALT FLD-CCNC: 17 U/L — SIGNIFICANT CHANGE UP (ref 10–45)
ANION GAP SERPL CALC-SCNC: 12 MMOL/L — SIGNIFICANT CHANGE UP (ref 5–17)
APTT BLD: 29.8 SEC — SIGNIFICANT CHANGE UP (ref 27.5–37.4)
AST SERPL-CCNC: 25 U/L — SIGNIFICANT CHANGE UP (ref 10–40)
BILIRUB SERPL-MCNC: 1.5 MG/DL — HIGH (ref 0.2–1.2)
BUN SERPL-MCNC: 14 MG/DL — SIGNIFICANT CHANGE UP (ref 7–23)
CALCIUM SERPL-MCNC: 13.2 MG/DL — CRITICAL HIGH (ref 8.4–10.5)
CHLORIDE SERPL-SCNC: 99 MMOL/L — SIGNIFICANT CHANGE UP (ref 96–108)
CO2 SERPL-SCNC: 26 MMOL/L — SIGNIFICANT CHANGE UP (ref 22–31)
CREAT SERPL-MCNC: 1.21 MG/DL — SIGNIFICANT CHANGE UP (ref 0.5–1.3)
GLUCOSE BLDC GLUCOMTR-MCNC: 119 MG/DL — HIGH (ref 70–99)
GLUCOSE BLDC GLUCOMTR-MCNC: 119 MG/DL — HIGH (ref 70–99)
GLUCOSE BLDC GLUCOMTR-MCNC: 121 MG/DL — HIGH (ref 70–99)
GLUCOSE BLDC GLUCOMTR-MCNC: 144 MG/DL — HIGH (ref 70–99)
GLUCOSE SERPL-MCNC: 117 MG/DL — HIGH (ref 70–99)
HCT VFR BLD CALC: 32.2 % — LOW (ref 39–50)
HGB BLD-MCNC: 10.3 G/DL — LOW (ref 13–17)
INR BLD: 1.24 — HIGH (ref 0.88–1.16)
MAGNESIUM SERPL-MCNC: 2.1 MG/DL — SIGNIFICANT CHANGE UP (ref 1.6–2.6)
MCHC RBC-ENTMCNC: 27.7 PG — SIGNIFICANT CHANGE UP (ref 27–34)
MCHC RBC-ENTMCNC: 32 G/DL — SIGNIFICANT CHANGE UP (ref 32–36)
MCV RBC AUTO: 86.6 FL — SIGNIFICANT CHANGE UP (ref 80–100)
PHOSPHATE SERPL-MCNC: 2.1 MG/DL — LOW (ref 2.5–4.5)
PLATELET # BLD AUTO: 267 K/UL — SIGNIFICANT CHANGE UP (ref 150–400)
POTASSIUM SERPL-MCNC: 4.2 MMOL/L — SIGNIFICANT CHANGE UP (ref 3.5–5.3)
POTASSIUM SERPL-SCNC: 4.2 MMOL/L — SIGNIFICANT CHANGE UP (ref 3.5–5.3)
PROT SERPL-MCNC: 6.7 G/DL — SIGNIFICANT CHANGE UP (ref 6–8.3)
PROTHROM AB SERPL-ACNC: 13.8 SEC — HIGH (ref 9.8–12.7)
PSA FLD-MCNC: 0.93 NG/ML — SIGNIFICANT CHANGE UP (ref 0–4)
PTH-INTACT FLD-MCNC: 270 PG/ML — HIGH (ref 15–65)
RBC # BLD: 3.72 M/UL — LOW (ref 4.2–5.8)
RBC # FLD: 13.2 % — SIGNIFICANT CHANGE UP (ref 10.3–16.9)
SODIUM SERPL-SCNC: 137 MMOL/L — SIGNIFICANT CHANGE UP (ref 135–145)
WBC # BLD: 10.8 K/UL — HIGH (ref 3.8–10.5)
WBC # FLD AUTO: 10.8 K/UL — HIGH (ref 3.8–10.5)

## 2018-09-13 PROCEDURE — 71045 X-RAY EXAM CHEST 1 VIEW: CPT | Mod: 26

## 2018-09-13 RX ORDER — PANTOPRAZOLE SODIUM 20 MG/1
40 TABLET, DELAYED RELEASE ORAL
Qty: 0 | Refills: 0 | Status: DISCONTINUED | OUTPATIENT
Start: 2018-09-13 | End: 2018-09-14

## 2018-09-13 RX ORDER — AMLODIPINE BESYLATE 2.5 MG/1
2.5 TABLET ORAL DAILY
Qty: 0 | Refills: 0 | Status: DISCONTINUED | OUTPATIENT
Start: 2018-09-13 | End: 2018-09-14

## 2018-09-13 RX ORDER — ONDANSETRON 8 MG/1
4 TABLET, FILM COATED ORAL ONCE
Qty: 0 | Refills: 0 | Status: DISCONTINUED | OUTPATIENT
Start: 2018-09-13 | End: 2018-09-13

## 2018-09-13 RX ORDER — BENZOCAINE AND MENTHOL 5; 1 G/100ML; G/100ML
1 LIQUID ORAL THREE TIMES A DAY
Qty: 0 | Refills: 0 | Status: DISCONTINUED | OUTPATIENT
Start: 2018-09-13 | End: 2018-09-14

## 2018-09-13 RX ORDER — SODIUM CHLORIDE 9 MG/ML
3 INJECTION INTRAMUSCULAR; INTRAVENOUS; SUBCUTANEOUS EVERY 8 HOURS
Qty: 0 | Refills: 0 | Status: DISCONTINUED | OUTPATIENT
Start: 2018-09-13 | End: 2018-09-14

## 2018-09-13 RX ORDER — METOCLOPRAMIDE HCL 10 MG
10 TABLET ORAL ONCE
Qty: 0 | Refills: 0 | Status: DISCONTINUED | OUTPATIENT
Start: 2018-09-13 | End: 2018-09-13

## 2018-09-13 RX ORDER — POTASSIUM PHOSPHATE, MONOBASIC POTASSIUM PHOSPHATE, DIBASIC 236; 224 MG/ML; MG/ML
15 INJECTION, SOLUTION INTRAVENOUS ONCE
Qty: 0 | Refills: 0 | Status: COMPLETED | OUTPATIENT
Start: 2018-09-13 | End: 2018-09-13

## 2018-09-13 RX ADMIN — Medication 650 MILLIGRAM(S): at 05:29

## 2018-09-13 RX ADMIN — Medication 81 MILLIGRAM(S): at 11:50

## 2018-09-13 RX ADMIN — Medication 100 MILLIGRAM(S): at 05:28

## 2018-09-13 RX ADMIN — OXYCODONE HYDROCHLORIDE 10 MILLIGRAM(S): 5 TABLET ORAL at 22:18

## 2018-09-13 RX ADMIN — AMLODIPINE BESYLATE 2.5 MILLIGRAM(S): 2.5 TABLET ORAL at 16:15

## 2018-09-13 RX ADMIN — SODIUM CHLORIDE 3 MILLILITER(S): 9 INJECTION INTRAMUSCULAR; INTRAVENOUS; SUBCUTANEOUS at 22:18

## 2018-09-13 RX ADMIN — Medication 650 MILLIGRAM(S): at 06:30

## 2018-09-13 RX ADMIN — OXYCODONE HYDROCHLORIDE 10 MILLIGRAM(S): 5 TABLET ORAL at 22:17

## 2018-09-13 RX ADMIN — CLOPIDOGREL BISULFATE 75 MILLIGRAM(S): 75 TABLET, FILM COATED ORAL at 11:50

## 2018-09-13 RX ADMIN — HEPARIN SODIUM 5000 UNIT(S): 5000 INJECTION INTRAVENOUS; SUBCUTANEOUS at 13:21

## 2018-09-13 RX ADMIN — POTASSIUM PHOSPHATE, MONOBASIC POTASSIUM PHOSPHATE, DIBASIC 62.5 MILLIMOLE(S): 236; 224 INJECTION, SOLUTION INTRAVENOUS at 05:31

## 2018-09-13 RX ADMIN — Medication 25 MILLIGRAM(S): at 05:28

## 2018-09-13 RX ADMIN — Medication 25 MILLIGRAM(S): at 13:21

## 2018-09-13 RX ADMIN — Medication 25 MILLIGRAM(S): at 22:16

## 2018-09-13 RX ADMIN — SODIUM CHLORIDE 3 MILLILITER(S): 9 INJECTION INTRAMUSCULAR; INTRAVENOUS; SUBCUTANEOUS at 15:46

## 2018-09-13 RX ADMIN — ATORVASTATIN CALCIUM 40 MILLIGRAM(S): 80 TABLET, FILM COATED ORAL at 22:16

## 2018-09-13 RX ADMIN — HEPARIN SODIUM 5000 UNIT(S): 5000 INJECTION INTRAVENOUS; SUBCUTANEOUS at 22:17

## 2018-09-13 RX ADMIN — HEPARIN SODIUM 5000 UNIT(S): 5000 INJECTION INTRAVENOUS; SUBCUTANEOUS at 05:28

## 2018-09-13 RX ADMIN — OXYCODONE HYDROCHLORIDE 5 MILLIGRAM(S): 5 TABLET ORAL at 16:15

## 2018-09-13 RX ADMIN — Medication 100 MILLIGRAM(S): at 22:16

## 2018-09-13 RX ADMIN — Medication 100 MILLIGRAM(S): at 13:21

## 2018-09-13 RX ADMIN — POLYETHYLENE GLYCOL 3350 17 GRAM(S): 17 POWDER, FOR SOLUTION ORAL at 11:50

## 2018-09-13 RX ADMIN — PANTOPRAZOLE SODIUM 40 MILLIGRAM(S): 20 TABLET, DELAYED RELEASE ORAL at 07:21

## 2018-09-13 RX ADMIN — BENZOCAINE AND MENTHOL 1 LOZENGE: 5; 1 LIQUID ORAL at 16:16

## 2018-09-13 RX ADMIN — SENNA PLUS 1 TABLET(S): 8.6 TABLET ORAL at 22:16

## 2018-09-13 NOTE — PROVIDER CONTACT NOTE (CRITICAL VALUE NOTIFICATION) - NS PROVIDER READ BACK
Clinical Nutrition Note      Patient Name: Lynne Sanchez  MRN: [id number]  Admission date: 6/27/2018      Reason for Assessment:  Multidisciplinary Rounds    Additional information obtained during MDR:  Pt doing well weel, surgery cancelled yesterday pt wanted to speak w/ additional family members and ID MD; she has decided to have toe amputated.    Current diet: Diet Regular; Cardiac  NPO Diet    Supplement: SB Boost Glucose Control w/ lunch and dinner    Pertinent medical data reviewed    Intervention:  Plan of care and goals reviewed    Monitor:  RD to follow per protocol      Sarah Beth Becerril MS,RD,LD  07/03/18 4:04 PM  Time: 20min        yes

## 2018-09-13 NOTE — PROGRESS NOTE ADULT - ASSESSMENT
This is a 53 year old M, former 2.5 pack year smoker (quit 3 months ago), PMHx HTN, HLD, and borderline DM presented to [facility] [place] with substernal chest pain.  Cardiac catheterization revealed 70% mLAD, 90% OM3, 99% ostial Ramus, 100%  proximal Ramus, 99% ostial RCA, and 95% left AV groove artery with EF 75%.  Dr. Reddy, CT Surgery, was consulted and patient was transferred to [facility] on 9/6/18 for further management.  On 9/10/18 he had uneventful OPCAB x 3, with radial and vein harvest, EF normal.  Post op course so far uneventful and transferred to stepdown floor today, POD 3.      Neurovascular: No delirium. Pain well controlled with current regimen.  -con't with oxycodone and tylenol prn     Cardiovascular: Hemodynamically stable. HR controlled.  -con't ASA/plavix/statin  -lopressor 25 mg Q8H, titrate as tolerated  -start low dose Norvasc if BP tolerates for radial grafts     Respiratory: Saturating well on room air.  Pulling > 1000 on IS  -If on oxygen wean to RA from for O2 Sat > 93%.  -Encourage C+DB and Use of IS 10x / hr while awake.  -PA/lat in am     GI: Stable.  -PPX.  -PO Diet.    Renal / : Hypercalcemia  -FU PTH and PSA   -Monitor renal function.  -Monitor I/O's.  -replete lytes prn to maintain K> 4.0 and Mg > 2.0    Endocrine: pre-DM, A1C 6.0  -continue mISS    Hematologic: No active issues  -con't HSQ for DVT ppx    ID: Afebrile, no active issues  -Observe for SIRS/Sepsis Syndrome.    Disposition:  -home when medically ready

## 2018-09-13 NOTE — PROGRESS NOTE ADULT - SUBJECTIVE AND OBJECTIVE BOX
Patient discussed on morning rounds with Dr. Reddy     Operation / Date: 9/10: OPCABx3, LIMA-LAD, Radial-OM, SVG-CAMPBELL EF 60%    SUBJECTIVE ASSESSMENT:  Patient transferred from CTICU to stepdown floor today, HD, no acute complaints.  Ambulating without difficulty, tolerating PO, passing gas, but no BM yet.  Denies HA, dizziness, CP, SOB, palpitations, N/V, leg swelling or calf pain.    Vital Signs Last 24 Hrs  T(C): 37.4 (13 Sep 2018 14:43), Max: 37.4 (13 Sep 2018 14:43)  T(F): 99.4 (13 Sep 2018 14:43), Max: 99.4 (13 Sep 2018 14:43)  HR: 88 (13 Sep 2018 12:24) (80 - 108)  BP: 141/80 (13 Sep 2018 12:24) (111/83 - 165/99)  BP(mean): 106 (13 Sep 2018 12:24) (87 - 119)  RR: 22 (13 Sep 2018 12:24) (16 - 34)  SpO2: 98% (13 Sep 2018 12:24) (92% - 99%)  I&O's Detail    12 Sep 2018 07:01  -  13 Sep 2018 07:00  --------------------------------------------------------  IN:    IV PiggyBack: 175 mL    Oral Fluid: 560 mL    sodium chloride 0.9%: 80 mL  Total IN: 815 mL    OUT:    Indwelling Catheter - Urethral: 725 mL    Voided: 2795 mL  Total OUT: 3520 mLAA    Total NET: -2705 mL      13 Sep 2018 07:01  -  13 Sep 2018 14:49  --------------------------------------------------------  IN:    Oral Fluid: 240 mL  Total IN: 240 mL    OUT:    Voided: 900 mL  Total OUT: 900 mL    Total NET: -660 mL    CHEST TUBE:  No.   MARIVEL DRAIN:  No.  EPICARDIAL WIRES: Yes  TIE DOWNS: Yes.  GOTTLIEB: No.    PHYSICAL EXAM:    General: OOB in chair, comfortable, NAD    Neurological: A&O x 3 MURILLO, no focal deficits    Cardiovascular: S1S2 RRR No M/G/R    Respiratory: CTA b/l No W/R/R    Gastrointestinal: soft, NT/ND    Extremities: No LE edema, b/l    Vascular:  right radial/DP pulses 2 + b/l.      Incision Sites: MSI, left leg vein and left radial harvest sites, healing well, no drainage, no dehiscence. No sternal click.    LABS:                        10.3   10.8  )-----------( 267      ( 13 Sep 2018 03:12 )             32.2       COUMADIN:  No.     PT/INR - ( 13 Sep 2018 03:11 )   PT: 13.8 sec;   INR: 1.24          PTT - ( 13 Sep 2018 03:11 )  PTT:29.8 sec    09-13    137  |  99  |  14  ----------------------------<  117<H>  4.2   |  26  |  1.21    Ca    13.2<HH>      13 Sep 2018 03:12  Phos  2.1     09-13  Mg     2.1     09-13    TPro  6.7  /  Alb  3.9  /  TBili  1.5<H>  /  DBili  x   /  AST  25  /  ALT  17  /  AlkPhos  82  09-13    MEDICATIONS  (STANDING):  aspirin enteric coated 81 milliGRAM(s) Oral daily  atorvastatin 40 milliGRAM(s) Oral at bedtime  clopidogrel Tablet 75 milliGRAM(s) Oral daily  dextrose 5%. 1000 milliLiter(s) (50 mL/Hr) IV Continuous <Continuous>  dextrose 50% Injectable 25 Gram(s) IV Push once  dextrose 50% Injectable 50 milliLiter(s) IV Push every 15 minutes  docusate sodium 100 milliGRAM(s) Oral three times a day  heparin  Injectable 5000 Unit(s) SubCutaneous every 8 hours  insulin lispro (HumaLOG) corrective regimen sliding scale   SubCutaneous Before meals and at bedtime  metoprolol tartrate 25 milliGRAM(s) Oral every 8 hours  polyethylene glycol 3350 17 Gram(s) Oral daily  senna 1 Tablet(s) Oral at bedtime  sodium chloride 0.9% lock flush 3 milliLiter(s) IV Push every 8 hours    MEDICATIONS  (PRN):  acetaminophen   Tablet .. 650 milliGRAM(s) Oral every 6 hours PRN Mild Pain (1 - 3)  dextrose 40% Gel 15 Gram(s) Oral once PRN Blood Glucose LESS THAN 70 milliGRAM(s)/deciliter  glucagon  Injectable 1 milliGRAM(s) IntraMuscular once PRN Glucose LESS THAN 70 milligrams/deciliter  oxyCODONE    IR 5 milliGRAM(s) Oral every 6 hours PRN Moderate Pain (4 - 6)  oxyCODONE    IR 10 milliGRAM(s) Oral every 6 hours PRN Severe Pain (7 - 10)        RADIOLOGY & ADDITIONAL TESTS:

## 2018-09-14 ENCOUNTER — TRANSCRIPTION ENCOUNTER (OUTPATIENT)
Age: 53
End: 2018-09-14

## 2018-09-14 VITALS
RESPIRATION RATE: 18 BRPM | DIASTOLIC BLOOD PRESSURE: 75 MMHG | HEART RATE: 96 BPM | SYSTOLIC BLOOD PRESSURE: 122 MMHG | OXYGEN SATURATION: 96 %

## 2018-09-14 LAB
ANION GAP SERPL CALC-SCNC: 13 MMOL/L — SIGNIFICANT CHANGE UP (ref 5–17)
BASOPHILS NFR BLD AUTO: 0.2 % — SIGNIFICANT CHANGE UP (ref 0–2)
BUN SERPL-MCNC: 20 MG/DL — SIGNIFICANT CHANGE UP (ref 7–23)
CALCIUM SERPL-MCNC: 12.2 MG/DL — HIGH (ref 8.4–10.5)
CALCIUM SERPL-MCNC: 13.5 MG/DL — CRITICAL HIGH (ref 8.4–10.5)
CHLORIDE SERPL-SCNC: 101 MMOL/L — SIGNIFICANT CHANGE UP (ref 96–108)
CO2 SERPL-SCNC: 23 MMOL/L — SIGNIFICANT CHANGE UP (ref 22–31)
CREAT SERPL-MCNC: 1.05 MG/DL — SIGNIFICANT CHANGE UP (ref 0.5–1.3)
EOSINOPHIL NFR BLD AUTO: 2.2 % — SIGNIFICANT CHANGE UP (ref 0–6)
GLUCOSE BLDC GLUCOMTR-MCNC: 123 MG/DL — HIGH (ref 70–99)
GLUCOSE BLDC GLUCOMTR-MCNC: 135 MG/DL — HIGH (ref 70–99)
GLUCOSE SERPL-MCNC: 123 MG/DL — HIGH (ref 70–99)
HCT VFR BLD CALC: 32.3 % — LOW (ref 39–50)
HGB BLD-MCNC: 10.3 G/DL — LOW (ref 13–17)
LYMPHOCYTES # BLD AUTO: 26.4 % — SIGNIFICANT CHANGE UP (ref 13–44)
MAGNESIUM SERPL-MCNC: 2.1 MG/DL — SIGNIFICANT CHANGE UP (ref 1.6–2.6)
MCHC RBC-ENTMCNC: 27.3 PG — SIGNIFICANT CHANGE UP (ref 27–34)
MCHC RBC-ENTMCNC: 31.9 G/DL — LOW (ref 32–36)
MCV RBC AUTO: 85.7 FL — SIGNIFICANT CHANGE UP (ref 80–100)
MONOCYTES NFR BLD AUTO: 8.3 % — SIGNIFICANT CHANGE UP (ref 2–14)
NEUTROPHILS NFR BLD AUTO: 62.9 % — SIGNIFICANT CHANGE UP (ref 43–77)
PHOSPHATE SERPL-MCNC: 2.3 MG/DL — LOW (ref 2.5–4.5)
PLATELET # BLD AUTO: 330 K/UL — SIGNIFICANT CHANGE UP (ref 150–400)
POTASSIUM SERPL-MCNC: 4.1 MMOL/L — SIGNIFICANT CHANGE UP (ref 3.5–5.3)
POTASSIUM SERPL-SCNC: 4.1 MMOL/L — SIGNIFICANT CHANGE UP (ref 3.5–5.3)
RBC # BLD: 3.77 M/UL — LOW (ref 4.2–5.8)
RBC # FLD: 13.2 % — SIGNIFICANT CHANGE UP (ref 10.3–16.9)
SODIUM SERPL-SCNC: 137 MMOL/L — SIGNIFICANT CHANGE UP (ref 135–145)
WBC # BLD: 8.3 K/UL — SIGNIFICANT CHANGE UP (ref 3.8–10.5)
WBC # FLD AUTO: 8.3 K/UL — SIGNIFICANT CHANGE UP (ref 3.8–10.5)

## 2018-09-14 PROCEDURE — 84100 ASSAY OF PHOSPHORUS: CPT

## 2018-09-14 PROCEDURE — 83605 ASSAY OF LACTIC ACID: CPT

## 2018-09-14 PROCEDURE — 82550 ASSAY OF CK (CPK): CPT

## 2018-09-14 PROCEDURE — 80048 BASIC METABOLIC PNL TOTAL CA: CPT

## 2018-09-14 PROCEDURE — 84443 ASSAY THYROID STIM HORMONE: CPT

## 2018-09-14 PROCEDURE — 84132 ASSAY OF SERUM POTASSIUM: CPT

## 2018-09-14 PROCEDURE — 83735 ASSAY OF MAGNESIUM: CPT

## 2018-09-14 PROCEDURE — 83880 ASSAY OF NATRIURETIC PEPTIDE: CPT

## 2018-09-14 PROCEDURE — 82330 ASSAY OF CALCIUM: CPT

## 2018-09-14 PROCEDURE — 84484 ASSAY OF TROPONIN QUANT: CPT

## 2018-09-14 PROCEDURE — 36415 COLL VENOUS BLD VENIPUNCTURE: CPT

## 2018-09-14 PROCEDURE — 94002 VENT MGMT INPAT INIT DAY: CPT

## 2018-09-14 PROCEDURE — 93005 ELECTROCARDIOGRAM TRACING: CPT

## 2018-09-14 PROCEDURE — 82310 ASSAY OF CALCIUM: CPT

## 2018-09-14 PROCEDURE — 85610 PROTHROMBIN TIME: CPT

## 2018-09-14 PROCEDURE — 97116 GAIT TRAINING THERAPY: CPT

## 2018-09-14 PROCEDURE — 85730 THROMBOPLASTIN TIME PARTIAL: CPT

## 2018-09-14 PROCEDURE — 86850 RBC ANTIBODY SCREEN: CPT

## 2018-09-14 PROCEDURE — 80053 COMPREHEN METABOLIC PANEL: CPT

## 2018-09-14 PROCEDURE — 94150 VITAL CAPACITY TEST: CPT

## 2018-09-14 PROCEDURE — P9045: CPT

## 2018-09-14 PROCEDURE — 82553 CREATINE MB FRACTION: CPT

## 2018-09-14 PROCEDURE — 93306 TTE W/DOPPLER COMPLETE: CPT

## 2018-09-14 PROCEDURE — 83036 HEMOGLOBIN GLYCOSYLATED A1C: CPT

## 2018-09-14 PROCEDURE — 86901 BLOOD TYPING SEROLOGIC RH(D): CPT

## 2018-09-14 PROCEDURE — G0103: CPT

## 2018-09-14 PROCEDURE — 82962 GLUCOSE BLOOD TEST: CPT

## 2018-09-14 PROCEDURE — 97161 PT EVAL LOW COMPLEX 20 MIN: CPT

## 2018-09-14 PROCEDURE — 86900 BLOOD TYPING SEROLOGIC ABO: CPT

## 2018-09-14 PROCEDURE — 71046 X-RAY EXAM CHEST 2 VIEWS: CPT

## 2018-09-14 PROCEDURE — 84295 ASSAY OF SERUM SODIUM: CPT

## 2018-09-14 PROCEDURE — 83970 ASSAY OF PARATHORMONE: CPT

## 2018-09-14 PROCEDURE — 85025 COMPLETE CBC W/AUTO DIFF WBC: CPT

## 2018-09-14 PROCEDURE — 93880 EXTRACRANIAL BILAT STUDY: CPT

## 2018-09-14 PROCEDURE — 86923 COMPATIBILITY TEST ELECTRIC: CPT

## 2018-09-14 PROCEDURE — 81003 URINALYSIS AUTO W/O SCOPE: CPT

## 2018-09-14 PROCEDURE — 80061 LIPID PANEL: CPT

## 2018-09-14 PROCEDURE — 85027 COMPLETE CBC AUTOMATED: CPT

## 2018-09-14 PROCEDURE — 71045 X-RAY EXAM CHEST 1 VIEW: CPT

## 2018-09-14 PROCEDURE — C1889: CPT

## 2018-09-14 PROCEDURE — 82803 BLOOD GASES ANY COMBINATION: CPT

## 2018-09-14 PROCEDURE — 80076 HEPATIC FUNCTION PANEL: CPT

## 2018-09-14 PROCEDURE — 71046 X-RAY EXAM CHEST 2 VIEWS: CPT | Mod: 26

## 2018-09-14 RX ORDER — AMLODIPINE BESYLATE 2.5 MG/1
1 TABLET ORAL
Qty: 30 | Refills: 0
Start: 2018-09-14 | End: 2018-10-13

## 2018-09-14 RX ORDER — ACETAMINOPHEN 500 MG
2 TABLET ORAL
Qty: 0 | Refills: 0 | COMMUNITY

## 2018-09-14 RX ORDER — ASPIRIN/CALCIUM CARB/MAGNESIUM 324 MG
1 TABLET ORAL
Qty: 0 | Refills: 0 | COMMUNITY

## 2018-09-14 RX ORDER — INFLUENZA VIRUS VACCINE 15; 15; 15; 15 UG/.5ML; UG/.5ML; UG/.5ML; UG/.5ML
0.5 SUSPENSION INTRAMUSCULAR ONCE
Qty: 0 | Refills: 0 | Status: DISCONTINUED | OUTPATIENT
Start: 2018-09-14 | End: 2018-09-14

## 2018-09-14 RX ORDER — ATORVASTATIN CALCIUM 80 MG/1
1 TABLET, FILM COATED ORAL
Qty: 30 | Refills: 0
Start: 2018-09-14 | End: 2018-10-13

## 2018-09-14 RX ORDER — CLOPIDOGREL BISULFATE 75 MG/1
1 TABLET, FILM COATED ORAL
Qty: 30 | Refills: 0
Start: 2018-09-14 | End: 2018-10-13

## 2018-09-14 RX ORDER — SENNA PLUS 8.6 MG/1
1 TABLET ORAL
Qty: 30 | Refills: 0
Start: 2018-09-14 | End: 2018-10-13

## 2018-09-14 RX ORDER — AMLODIPINE BESYLATE 2.5 MG/1
1 TABLET ORAL
Qty: 0 | Refills: 0 | COMMUNITY

## 2018-09-14 RX ORDER — ASPIRIN/CALCIUM CARB/MAGNESIUM 324 MG
1 TABLET ORAL
Qty: 30 | Refills: 0 | OUTPATIENT
Start: 2018-09-14 | End: 2018-10-13

## 2018-09-14 RX ORDER — PANTOPRAZOLE SODIUM 20 MG/1
1 TABLET, DELAYED RELEASE ORAL
Qty: 30 | Refills: 0
Start: 2018-09-14 | End: 2018-10-13

## 2018-09-14 RX ORDER — OXYCODONE HYDROCHLORIDE 5 MG/1
1 TABLET ORAL
Qty: 21 | Refills: 0 | OUTPATIENT
Start: 2018-09-14 | End: 2018-09-20

## 2018-09-14 RX ORDER — METOPROLOL TARTRATE 50 MG
1.5 TABLET ORAL
Qty: 90 | Refills: 0
Start: 2018-09-14 | End: 2018-10-13

## 2018-09-14 RX ORDER — POLYETHYLENE GLYCOL 3350 17 G/17G
17 POWDER, FOR SOLUTION ORAL
Qty: 1 | Refills: 0
Start: 2018-09-14 | End: 2018-10-13

## 2018-09-14 RX ORDER — ACETAMINOPHEN 500 MG
2 TABLET ORAL
Qty: 42 | Refills: 0 | OUTPATIENT
Start: 2018-09-14 | End: 2018-09-20

## 2018-09-14 RX ORDER — METOPROLOL TARTRATE 50 MG
0 TABLET ORAL
Qty: 0 | Refills: 0 | COMMUNITY

## 2018-09-14 RX ORDER — DOCUSATE SODIUM 100 MG
1 CAPSULE ORAL
Qty: 90 | Refills: 0
Start: 2018-09-14 | End: 2018-10-13

## 2018-09-14 RX ADMIN — POLYETHYLENE GLYCOL 3350 17 GRAM(S): 17 POWDER, FOR SOLUTION ORAL at 11:13

## 2018-09-14 RX ADMIN — SODIUM CHLORIDE 3 MILLILITER(S): 9 INJECTION INTRAMUSCULAR; INTRAVENOUS; SUBCUTANEOUS at 15:24

## 2018-09-14 RX ADMIN — Medication 81 MILLIGRAM(S): at 11:13

## 2018-09-14 RX ADMIN — Medication 25 MILLIGRAM(S): at 05:32

## 2018-09-14 RX ADMIN — OXYCODONE HYDROCHLORIDE 10 MILLIGRAM(S): 5 TABLET ORAL at 14:48

## 2018-09-14 RX ADMIN — AMLODIPINE BESYLATE 2.5 MILLIGRAM(S): 2.5 TABLET ORAL at 05:32

## 2018-09-14 RX ADMIN — OXYCODONE HYDROCHLORIDE 10 MILLIGRAM(S): 5 TABLET ORAL at 16:30

## 2018-09-14 RX ADMIN — OXYCODONE HYDROCHLORIDE 5 MILLIGRAM(S): 5 TABLET ORAL at 15:24

## 2018-09-14 RX ADMIN — Medication 25 MILLIGRAM(S): at 14:09

## 2018-09-14 RX ADMIN — Medication 100 MILLIGRAM(S): at 14:09

## 2018-09-14 RX ADMIN — HEPARIN SODIUM 5000 UNIT(S): 5000 INJECTION INTRAVENOUS; SUBCUTANEOUS at 05:32

## 2018-09-14 RX ADMIN — HEPARIN SODIUM 5000 UNIT(S): 5000 INJECTION INTRAVENOUS; SUBCUTANEOUS at 14:09

## 2018-09-14 RX ADMIN — OXYCODONE HYDROCHLORIDE 10 MILLIGRAM(S): 5 TABLET ORAL at 07:30

## 2018-09-14 RX ADMIN — CLOPIDOGREL BISULFATE 75 MILLIGRAM(S): 75 TABLET, FILM COATED ORAL at 11:13

## 2018-09-14 RX ADMIN — Medication 100 MILLIGRAM(S): at 05:34

## 2018-09-14 RX ADMIN — PANTOPRAZOLE SODIUM 40 MILLIGRAM(S): 20 TABLET, DELAYED RELEASE ORAL at 07:13

## 2018-09-14 RX ADMIN — SODIUM CHLORIDE 3 MILLILITER(S): 9 INJECTION INTRAMUSCULAR; INTRAVENOUS; SUBCUTANEOUS at 05:33

## 2018-09-14 RX ADMIN — OXYCODONE HYDROCHLORIDE 10 MILLIGRAM(S): 5 TABLET ORAL at 06:09

## 2018-09-14 NOTE — PROGRESS NOTE ADULT - SUBJECTIVE AND OBJECTIVE BOX
Patient discussed on morning rounds with       Operation / Date:     Surgeon:    Referring Physician:    SUBJECTIVE ASSESSMENT:  53y Male     Hospital Course:    Vital Signs Last 24 Hrs  T(C): 36.9 (14 Sep 2018 13:05), Max: 37.6 (14 Sep 2018 00:00)  T(F): 98.4 (14 Sep 2018 13:05), Max: 99.6 (14 Sep 2018 00:00)  HR: 96 (14 Sep 2018 13:19) (82 - 96)  BP: 122/75 (14 Sep 2018 13:19) (121/71 - 137/88)  BP(mean): 93 (14 Sep 2018 13:19) (88 - 106)  RR: 18 (14 Sep 2018 13:19) (18 - 22)  SpO2: 96% (14 Sep 2018 13:19) (96% - 97%)  I&O's Detail    13 Sep 2018 07:01  -  14 Sep 2018 07:00  --------------------------------------------------------  IN:    Oral Fluid: 590 mL  Total IN: 590 mL    OUT:    Voided: 3150 mL  Total OUT: 3150 mL    Total NET: -2560 mL      14 Sep 2018 07:01  -  14 Sep 2018 14:39  --------------------------------------------------------  IN:    Oral Fluid: 540 mL  Total IN: 540 mL    OUT:    Voided: 300 mL  Total OUT: 300 mL    Total NET: 240 mL    EPICARDIAL WIRES REMOVED: no  TIE DOWNS REMOVED: no    PHYSICAL EXAM:    General: NAD    Neurological: grossly intact.  strength +5/+5    Cardiovascular: RRR without murmur    Respiratory: no wheezing and no rhonchi     Gastrointestinal: BM and BS are intact    Extremities: no edema bilaterally LE    Vascular: pulses are intact +2/+2    Incision Sites:    LABS:                        10.3   8.3   )-----------( 330      ( 14 Sep 2018 06:12 )             32.3       COUMADIN: no    PT/INR - ( 13 Sep 2018 03:11 )   PT: 13.8 sec;   INR: 1.24          PTT - ( 13 Sep 2018 03:11 )  PTT:29.8 sec    09-14    137  |  101  |  20  ----------------------------<  123<H>  4.1   |  23  |  1.05    Ca    13.5<HH>      14 Sep 2018 06:12  Phos  2.3     09-14  Mg     2.1     09-14    TPro  6.7  /  Alb  3.9  /  TBili  1.5<H>  /  DBili  x   /  AST  25  /  ALT  17  /  AlkPhos  82  09-13      MEDICATIONS  (STANDING):  amLODIPine   Tablet 2.5 milliGRAM(s) Oral daily  aspirin enteric coated 81 milliGRAM(s) Oral daily  atorvastatin 40 milliGRAM(s) Oral at bedtime  clopidogrel Tablet 75 milliGRAM(s) Oral daily  dextrose 5%. 1000 milliLiter(s) (50 mL/Hr) IV Continuous <Continuous>  dextrose 50% Injectable 25 Gram(s) IV Push once  dextrose 50% Injectable 50 milliLiter(s) IV Push every 15 minutes  docusate sodium 100 milliGRAM(s) Oral three times a day  heparin  Injectable 5000 Unit(s) SubCutaneous every 8 hours  insulin lispro (HumaLOG) corrective regimen sliding scale   SubCutaneous Before meals and at bedtime  metoprolol tartrate 25 milliGRAM(s) Oral every 8 hours  pantoprazole    Tablet 40 milliGRAM(s) Oral before breakfast  polyethylene glycol 3350 17 Gram(s) Oral daily  senna 1 Tablet(s) Oral at bedtime  sodium chloride 0.9% lock flush 3 milliLiter(s) IV Push every 8 hours      Discharge CXR:    Discharge ECHO: Patient discussed on morning rounds with Dr. Reddy      Operation / Date: 09/10/18 OPCAB    Surgeon: MD Barry    Referring Physician: MD Claudio    SUBJECTIVE ASSESSMENT:  53y Male underwent surgical intervention for CAD (OPCAB LIMA to LAD, radial to OM and SVG to CAMPBELL) is afebrile and hemodynamically stable.  Patient is encouraged to ambulate.  He has PA/LAT CXR today identifying no effusion nor pneumothorax.  Patient reports pain under control and he denies chest pain, shortness or breath or syncopes.      Hospital Course:  This 53 year male, former smoker, with PMH significant for HTN, HLD, boarderline DM and recent findings of CAD with associated  worsening chest pain (10/10 stabling in nature radiating to his back and left arm, nausea vomiting and dizziness) presented to this admission for surgical evaluation.  Patient underwent OPCAB x3 (LIMA to LAD, radial to OM, and SVG to CAMPBELL EF 60%) by Dr. Reddy.  He tolerated the procedures well.  For further details, please refer the operative reports.  On POD#1, patient was extubated without difficulty.  Subsequently, patient's chest tubes were removed on POD#1-2.  On POD#3, patient presented with persistent hypercalcemia; PTH/PSA was sent for study.  It has shown the elevated valve; the service is aware and has been continued monitored.   On POD#4, patient is hemodynamically stable.  He is afebrile and ambulates several time in room air without difficulty.  Patient is discharge to home with his family.    Vital Signs Last 24 Hrs  T(C): 36.9 (14 Sep 2018 13:05), Max: 37.6 (14 Sep 2018 00:00)  T(F): 98.4 (14 Sep 2018 13:05), Max: 99.6 (14 Sep 2018 00:00)  HR: 96 (14 Sep 2018 13:19) (82 - 96)  BP: 122/75 (14 Sep 2018 13:19) (121/71 - 137/88)  BP(mean): 93 (14 Sep 2018 13:19) (88 - 106)  RR: 18 (14 Sep 2018 13:19) (18 - 22)  SpO2: 96% (14 Sep 2018 13:19) (96% - 97%)  I&O's Detail    13 Sep 2018 07:01  -  14 Sep 2018 07:00  --------------------------------------------------------  IN:    Oral Fluid: 590 mL  Total IN: 590 mL    OUT:    Voided: 3150 mL  Total OUT: 3150 mL    Total NET: -2560 mL      14 Sep 2018 07:01  -  14 Sep 2018 14:39  --------------------------------------------------------  IN:    Oral Fluid: 540 mL  Total IN: 540 mL    OUT:    Voided: 300 mL  Total OUT: 300 mL    Total NET: 240 mL    EPICARDIAL WIRES REMOVED: no  TIE DOWNS REMOVED: no    PHYSICAL EXAM:    General: NAD    Neurological: grossly intact.  strength +5/+5    Cardiovascular: RRR without murmur    Respiratory: no wheezing and no rhonchi     Gastrointestinal: BM and BS are intact    Extremities: no edema bilaterally LE    Vascular: pulses are intact +2/+2    Incision Sites:    LABS:                        10.3   8.3   )-----------( 330      ( 14 Sep 2018 06:12 )             32.3       COUMADIN: no    PT/INR - ( 13 Sep 2018 03:11 )   PT: 13.8 sec;   INR: 1.24          PTT - ( 13 Sep 2018 03:11 )  PTT:29.8 sec    09-14    137  |  101  |  20  ----------------------------<  123<H>  4.1   |  23  |  1.05    Ca    13.5<HH>      14 Sep 2018 06:12  Phos  2.3     09-14  Mg     2.1     09-14    TPro  6.7  /  Alb  3.9  /  TBili  1.5<H>  /  DBili  x   /  AST  25  /  ALT  17  /  AlkPhos  82  09-13      MEDICATIONS  (STANDING):  amLODIPine   Tablet 2.5 milliGRAM(s) Oral daily  aspirin enteric coated 81 milliGRAM(s) Oral daily  atorvastatin 40 milliGRAM(s) Oral at bedtime  clopidogrel Tablet 75 milliGRAM(s) Oral daily  dextrose 5%. 1000 milliLiter(s) (50 mL/Hr) IV Continuous <Continuous>  dextrose 50% Injectable 25 Gram(s) IV Push once  dextrose 50% Injectable 50 milliLiter(s) IV Push every 15 minutes  docusate sodium 100 milliGRAM(s) Oral three times a day  heparin  Injectable 5000 Unit(s) SubCutaneous every 8 hours  insulin lispro (HumaLOG) corrective regimen sliding scale   SubCutaneous Before meals and at bedtime  metoprolol tartrate 25 milliGRAM(s) Oral every 8 hours  pantoprazole    Tablet 40 milliGRAM(s) Oral before breakfast  polyethylene glycol 3350 17 Gram(s) Oral daily  senna 1 Tablet(s) Oral at bedtime  sodium chloride 0.9% lock flush 3 milliLiter(s) IV Push every 8 hours      Discharge CXR:  An AP portable view of the chest reveals no significant change. There is   poor inspiratory volume. Left pleural effusion and left lower lobe   infiltrate. Prominent pulmonary vascularity likely due to poor   inspiration. Right central venous catheter with the tip in the right   atrium. Trace left apical pneumothorax again seen. Air-filled bowel.    IMPRESSION:  No significant interval change.    Discharge ECHO:    n/a Patient discussed on morning rounds with Dr. Reddy      Operation / Date: 09/10/18 OPCAB    Surgeon: MD Barry    Referring Physician: MD Claudio    SUBJECTIVE ASSESSMENT:  53y Male underwent surgical intervention for CAD (OPCAB LIMA to LAD, radial to OM and SVG to CAMPBELL) is afebrile and hemodynamically stable.  Patient is encouraged to ambulate.  He has PA/LAT CXR today identifying no effusion nor pneumothorax.  Patient reports pain under control and he denies chest pain, shortness or breath or syncopes.      Hospital Course:  This 53 year male, former smoker, with PMH significant for HTN, HLD, boarderline DM and recent findings of CAD with associated  worsening chest pain (10/10 stabling in nature radiating to his back and left arm, nausea vomiting and dizziness) presented to this admission for surgical evaluation.  Patient underwent OPCAB x3 (LIMA to LAD, radial to OM, and SVG to ACMPBELL EF 60%) by Dr. Reddy.  He tolerated the procedures well.  For further details, please refer the operative reports.  On POD#1, patient was extubated without difficulty.  Subsequently, patient's chest tubes were removed on POD#1-2.  On POD#3, patient presented with persistent hypercalcemia; PTH/PSA was sent for study.  It has shown the elevated valve; the service is aware and has been continued monitored.   On POD#4, patient is hemodynamically stable.  He is afebrile and ambulates several time in room air without difficulty.  Patient is discharge to home with his family.    Vital Signs Last 24 Hrs  T(C): 36.9 (14 Sep 2018 13:05), Max: 37.6 (14 Sep 2018 00:00)  T(F): 98.4 (14 Sep 2018 13:05), Max: 99.6 (14 Sep 2018 00:00)  HR: 96 (14 Sep 2018 13:19) (82 - 96)  BP: 122/75 (14 Sep 2018 13:19) (121/71 - 137/88)  BP(mean): 93 (14 Sep 2018 13:19) (88 - 106)  RR: 18 (14 Sep 2018 13:19) (18 - 22)  SpO2: 96% (14 Sep 2018 13:19) (96% - 97%)  I&O's Detail    13 Sep 2018 07:01  -  14 Sep 2018 07:00  --------------------------------------------------------  IN:    Oral Fluid: 590 mL  Total IN: 590 mL    OUT:    Voided: 3150 mL  Total OUT: 3150 mL    Total NET: -2560 mL      14 Sep 2018 07:01  -  14 Sep 2018 14:39  --------------------------------------------------------  IN:    Oral Fluid: 540 mL  Total IN: 540 mL    OUT:    Voided: 300 mL  Total OUT: 300 mL    Total NET: 240 mL    EPICARDIAL WIRES REMOVED: no  TIE DOWNS REMOVED: no    PHYSICAL EXAM:    General: NAD    Neurological: grossly intact.  strength +5/+5    Cardiovascular: RRR without murmur    Respiratory: no wheezing and no rhonchi     Gastrointestinal: BM and BS are intact    Extremities: no edema bilaterally LE    Vascular: pulses are intact +2/+2    Incision Sites:    LABS:                        10.3   8.3   )-----------( 330      ( 14 Sep 2018 06:12 )             32.3       COUMADIN: no    PT/INR - ( 13 Sep 2018 03:11 )   PT: 13.8 sec;   INR: 1.24          PTT - ( 13 Sep 2018 03:11 )  PTT:29.8 sec    09-14    137  |  101  |  20  ----------------------------<  123<H>  4.1   |  23  |  1.05    Ca    13.5<HH>      14 Sep 2018 06:12  Phos  2.3     09-14  Mg     2.1     09-14    TPro  6.7  /  Alb  3.9  /  TBili  1.5<H>  /  DBili  x   /  AST  25  /  ALT  17  /  AlkPhos  82  09-13      MEDICATIONS  (STANDING):  amLODIPine   Tablet 2.5 milliGRAM(s) Oral daily  aspirin enteric coated 81 milliGRAM(s) Oral daily  atorvastatin 40 milliGRAM(s) Oral at bedtime  clopidogrel Tablet 75 milliGRAM(s) Oral daily  metoprolol tartrate 37.5 milliGRAM(s) Oral every 12 hours  pantoprazole    Tablet 40 milliGRAM(s) Oral before breakfast  polyethylene glycol 3350 17 Gram(s) Oral daily  senna 1 Tablet(s) Oral at bedtime    Discharge CXR:  An AP portable view of the chest reveals no significant change. There is   poor inspiratory volume. Left pleural effusion and left lower lobe   infiltrate. Prominent pulmonary vascularity likely due to poor   inspiration. Right central venous catheter with the tip in the right   atrium. Trace left apical pneumothorax again seen. Air-filled bowel.    IMPRESSION:  No significant interval change.    Discharge ECHO:    n/a

## 2018-09-14 NOTE — DISCHARGE NOTE ADULT - MEDICATION SUMMARY - MEDICATIONS TO TAKE
I will START or STAY ON the medications listed below when I get home from the hospital:    Aspirin Enteric Coated 81 mg oral delayed release tablet  -- 1 tab(s) by mouth once a day  -- Indication: For CAD    ibuprofen 800 mg oral tablet  -- 1 tab(s) by mouth 3 times a day, As Needed  -- Indication: For Pain control     oxyCODONE 10 mg oral tablet  -- 1 tab(s) by mouth every 8 hours, As Needed -Severe Pain (7 - 10) MDD:3  -- Indication: For Pain control     acetaminophen 325 mg oral tablet  -- 2 tab(s) by mouth every 8 hours, As Needed -Mild Pain (1 - 3) MDD:6  -- Indication: For Pain control     atorvastatin 40 mg oral tablet  -- 1 tab(s) by mouth once a day (at bedtime)  -- Indication: For antidyslipidemia    fenofibrate  -- Indication: For HLD (hyperlipidemia)    clopidogrel 75 mg oral tablet  -- 1 tab(s) by mouth once a day  -- Indication: For CAD    metoprolol tartrate 25 mg oral tablet  -- 1.5 tab(s) by mouth 2 times a day   -- Indication: For Antihypertension     amLODIPine 2.5 mg oral tablet  -- 1 tab(s) by mouth once a day  -- Indication: For antihypertension     polyethylene glycol 3350 oral powder for reconstitution  -- 17 gram(s) by mouth once a day as needed   -- Indication: For GI PPX    docusate sodium 100 mg oral capsule  -- 1 cap(s) by mouth 3 times a day -for constipation as needed  -- Indication: For GI PPX    senna oral tablet  -- 1 tab(s) by mouth once a day (at bedtime) -for constipation as needed  -- Indication: For GI PPX    pantoprazole 40 mg oral delayed release tablet  -- 1 tab(s) by mouth once a day (before a meal)  -- Indication: For GI PPX I will START or STAY ON the medications listed below when I get home from the hospital:    Aspirin Enteric Coated 81 mg oral delayed release tablet  -- 1 tab(s) by mouth once a day  -- Indication: For CAD    ibuprofen 800 mg oral tablet  -- 1 tab(s) by mouth 3 times a day, As Needed  -- Indication: For Pain control     oxyCODONE 10 mg oral tablet  -- 1 tab(s) by mouth every 8 hours, As Needed -Severe Pain (7 - 10) MDD:3  -- Indication: For Pain control     acetaminophen 325 mg oral tablet  -- 2 tab(s) by mouth every 8 hours, As Needed -Mild Pain (1 - 3) MDD:6  -- Indication: For Pain control     atorvastatin 40 mg oral tablet  -- 1 tab(s) by mouth once a day (at bedtime)  -- Indication: For HLD (hyperlipidemia)    fenofibrate  -- Indication: For HLD (hyperlipidemia)    clopidogrel 75 mg oral tablet  -- 1 tab(s) by mouth once a day  -- Indication: For CAD    metoprolol tartrate 25 mg oral tablet  -- 1.5 tab(s) by mouth 2 times a day   -- Indication: For HTN (hypertension)    amLODIPine 2.5 mg oral tablet  -- 1 tab(s) by mouth once a day  -- Indication: For HTN (hypertension)    polyethylene glycol 3350 oral powder for reconstitution  -- 17 gram(s) by mouth once a day as needed   -- Indication: For GI PPX    docusate sodium 100 mg oral capsule  -- 1 cap(s) by mouth 3 times a day -for constipation as needed  -- Indication: For GI PPX    senna oral tablet  -- 1 tab(s) by mouth once a day (at bedtime) -for constipation as needed  -- Indication: For GI PPX    pantoprazole 40 mg oral delayed release tablet  -- 1 tab(s) by mouth once a day (before a meal)  -- Indication: For GI PPX

## 2018-09-14 NOTE — DISCHARGE NOTE ADULT - CARE PROVIDER_API CALL
Raffy Reddy (MD), Cardiovascular Surgery  [address]  [address] [place] [address]  Phone: [phone]  Fax: [phone]    James Snyder (), Cardiovascular Disease; Interventional Cardiology  [address] [address]  Phone: [phone]  Fax: [phone]

## 2018-09-14 NOTE — DISCHARGE NOTE ADULT - CARE PROVIDERS DIRECT ADDRESSES
[email or web address]

## 2018-09-14 NOTE — DISCHARGE NOTE ADULT - PLAN OF CARE
expect for full recovery continue ASA/Plavix  continue ambulation and deep breathing with incentive spirometer

## 2018-09-14 NOTE — DISCHARGE NOTE ADULT - PATIENT PORTAL LINK FT
You can access the [facility] Patient Portal, offered by [facility], by registering with the following website: [email or web address]

## 2018-09-14 NOTE — DISCHARGE NOTE ADULT - CARE PLAN
Principal Discharge DX:	CAD (coronary artery disease)  Goal:	expect for full recovery  Assessment and plan of treatment:	continue ASA/Plavix  continue ambulation and deep breathing with incentive spirometer

## 2018-09-14 NOTE — DISCHARGE NOTE ADULT - MEDICATION SUMMARY - MEDICATIONS TO CHANGE
I will SWITCH the dose or number of times a day I take the medications listed below when I get home from the hospital:    Metoprolol Tartrate    Norvasc 10 mg oral tablet  -- 1 tab(s) by mouth once a day

## 2018-09-14 NOTE — PROVIDER CONTACT NOTE (CRITICAL VALUE NOTIFICATION) - SITUATION
MAYUR Fragoso was notified of elevated Ca + level. Will cont to monitor.
Pt calcium 13.2
Pt lactate 11.2

## 2018-09-14 NOTE — DISCHARGE NOTE ADULT - HOSPITAL COURSE
This 53 year male, former smoker, with PMH significant for HTN, HLD, boarderline DM and recent findings of CAD with associated  worsening chest pain (10/10 stabling in nature radiating to his back and left arm, nausea vomiting and dizziness) presented to this admission for surgical evaluation.  Patient underwent OPCAB x3 (LIMA to LAD, radial to OM, and SVG to CAMPBELL EF 60%) by Dr. Reddy.  He tolerated the procedures well.  For further details, please refer the operative reports.  On POD#1, patient was extubated without difficulty.  Subsequently, patient's chest tubes were removed on POD#1-2.  On POD#3, patient presented with persistent hypercalcemia; PTH/PSA was sent for study.  It has shown the elevated valve; the service is aware and has been continued monitored.   On POD#4, patient is hemodynamically stable.  He is afebrile and ambulates several time in room air without difficulty.  Patient is discharge to home with his family.

## 2018-09-14 NOTE — DISCHARGE NOTE ADULT - ADDITIONAL INSTRUCTIONS
-Please follow up with Dr. Reddy on 09/25/18.  The office is located at [facility], [facility], 4th floor. Call us with any questions #[phone].    -Walk daily as tolerated and use your incentive spirometer every hour.    -No driving or strenuous activity/exercise for 6 weeks, or until cleared by your surgeon.    -Gently clean your incisions with anti-bacterial soap and water, pat dry.  You may leave them open to air.    -Call your doctor if you have shortness of breath, chest pain not relieved by pain medication, dizziness, fever >101.5, or increased redness or drainage from incisions.

## 2018-09-18 VITALS — HEIGHT: 61.81 IN | WEIGHT: 158.73 LBS | BODY MASS INDEX: 29.21 KG/M2

## 2018-09-18 PROBLEM — I25.10 CAD (CORONARY ARTERY DISEASE): Status: ACTIVE | Noted: 2018-09-18

## 2018-09-18 PROBLEM — Z87.891 FORMER SMOKER: Status: ACTIVE | Noted: 2018-09-18

## 2018-09-18 PROBLEM — E78.5 HYPERLIPIDEMIA, UNSPECIFIED: Chronic | Status: ACTIVE | Noted: 2018-09-06

## 2018-09-18 PROBLEM — I10 ESSENTIAL (PRIMARY) HYPERTENSION: Chronic | Status: ACTIVE | Noted: 2018-09-06

## 2018-09-18 PROBLEM — Z86.39 HISTORY OF DIABETES MELLITUS: Status: RESOLVED | Noted: 2018-09-18 | Resolved: 2018-09-18

## 2018-09-18 PROBLEM — Z09 POSTOP CHECK: Status: ACTIVE | Noted: 2018-09-18

## 2018-09-18 PROBLEM — Z86.79 HISTORY OF HYPERTENSION: Status: RESOLVED | Noted: 2018-09-18 | Resolved: 2018-09-18

## 2018-09-18 PROBLEM — Z86.39 HISTORY OF HYPERLIPIDEMIA: Status: RESOLVED | Noted: 2018-09-18 | Resolved: 2018-09-18

## 2018-09-18 RX ORDER — FENOFIBRATE 145 MG/1
145 TABLET, COATED ORAL DAILY
Refills: 0 | Status: ACTIVE | COMMUNITY

## 2018-09-18 RX ORDER — PANTOPRAZOLE 40 MG/1
40 TABLET, DELAYED RELEASE ORAL DAILY
Qty: 1 | Refills: 0 | Status: ACTIVE | COMMUNITY

## 2018-09-18 RX ORDER — METOPROLOL TARTRATE 25 MG/1
25 TABLET, FILM COATED ORAL
Qty: 30 | Refills: 3 | Status: ACTIVE | COMMUNITY

## 2018-09-18 RX ORDER — AMLODIPINE BESYLATE 2.5 MG/1
2.5 TABLET ORAL
Qty: 30 | Refills: 5 | Status: ACTIVE | COMMUNITY

## 2018-09-18 RX ORDER — ATORVASTATIN CALCIUM 40 MG/1
40 TABLET, FILM COATED ORAL
Qty: 90 | Refills: 1 | Status: ACTIVE | COMMUNITY

## 2018-09-18 RX ORDER — ASPIRIN 81 MG
81 TABLET, DELAYED RELEASE (ENTERIC COATED) ORAL DAILY
Refills: 6 | Status: ACTIVE | COMMUNITY

## 2018-09-18 RX ORDER — CLOPIDOGREL BISULFATE 75 MG/1
75 TABLET, FILM COATED ORAL DAILY
Qty: 1 | Refills: 0 | Status: ACTIVE | COMMUNITY

## 2018-09-19 DIAGNOSIS — E83.52 HYPERCALCEMIA: ICD-10-CM

## 2018-09-19 DIAGNOSIS — Z87.891 PERSONAL HISTORY OF NICOTINE DEPENDENCE: ICD-10-CM

## 2018-09-19 DIAGNOSIS — I50.32 CHRONIC DIASTOLIC (CONGESTIVE) HEART FAILURE: ICD-10-CM

## 2018-09-19 DIAGNOSIS — J44.9 CHRONIC OBSTRUCTIVE PULMONARY DISEASE, UNSPECIFIED: ICD-10-CM

## 2018-09-19 DIAGNOSIS — I25.119 ATHEROSCLEROTIC HEART DISEASE OF NATIVE CORONARY ARTERY WITH UNSPECIFIED ANGINA PECTORIS: ICD-10-CM

## 2018-09-19 DIAGNOSIS — I10 ESSENTIAL (PRIMARY) HYPERTENSION: ICD-10-CM

## 2018-09-19 DIAGNOSIS — E78.5 HYPERLIPIDEMIA, UNSPECIFIED: ICD-10-CM

## 2018-09-19 DIAGNOSIS — J95.812 POSTPROCEDURAL AIR LEAK: ICD-10-CM

## 2018-09-19 DIAGNOSIS — I11.0 HYPERTENSIVE HEART DISEASE WITH HEART FAILURE: ICD-10-CM

## 2018-09-20 ENCOUNTER — APPOINTMENT (OUTPATIENT)
Age: 53
End: 2018-09-20
Payer: MEDICAID

## 2018-09-20 PROCEDURE — XXXXX: CPT

## 2018-09-24 ENCOUNTER — FORM ENCOUNTER (OUTPATIENT)
Age: 53
End: 2018-09-24

## 2018-09-25 ENCOUNTER — APPOINTMENT (OUTPATIENT)
Dept: CARDIOTHORACIC SURGERY | Facility: CLINIC | Age: 53
End: 2018-09-25
Payer: MEDICAID

## 2018-09-25 ENCOUNTER — OUTPATIENT (OUTPATIENT)
Dept: OUTPATIENT SERVICES | Facility: HOSPITAL | Age: 53
LOS: 1 days | End: 2018-09-25
Payer: MEDICAID

## 2018-09-25 VITALS
HEART RATE: 77 BPM | OXYGEN SATURATION: 95 % | DIASTOLIC BLOOD PRESSURE: 94 MMHG | SYSTOLIC BLOOD PRESSURE: 135 MMHG | RESPIRATION RATE: 18 BRPM | TEMPERATURE: 97.8 F | WEIGHT: 146 LBS | BODY MASS INDEX: 26.87 KG/M2

## 2018-09-25 DIAGNOSIS — Z98.1 ARTHRODESIS STATUS: Chronic | ICD-10-CM

## 2018-09-25 PROCEDURE — 99024 POSTOP FOLLOW-UP VISIT: CPT

## 2018-09-25 PROCEDURE — 71046 X-RAY EXAM CHEST 2 VIEWS: CPT

## 2018-09-25 PROCEDURE — 71046 X-RAY EXAM CHEST 2 VIEWS: CPT | Mod: 26

## 2018-09-27 DIAGNOSIS — Z00.6 ENCOUNTER FOR EXAMINATION FOR NORMAL COMPARISON AND CONTROL IN CLINICAL RESEARCH PROGRAM: ICD-10-CM

## 2018-10-03 PROBLEM — Z00.6 RESEARCH SUBJECT: Status: RESOLVED | Noted: 2018-10-03 | Resolved: 2018-10-03

## 2018-10-31 ENCOUNTER — MESSAGE (OUTPATIENT)
Age: 53
End: 2018-10-31

## 2018-11-17 ENCOUNTER — INPATIENT (INPATIENT)
Facility: HOSPITAL | Age: 53
LOS: 0 days | Discharge: HOME | DRG: 303 | End: 2018-11-18
Attending: INTERNAL MEDICINE | Admitting: INTERNAL MEDICINE
Payer: COMMERCIAL

## 2018-11-17 VITALS
SYSTOLIC BLOOD PRESSURE: 167 MMHG | RESPIRATION RATE: 20 BRPM | HEART RATE: 89 BPM | DIASTOLIC BLOOD PRESSURE: 103 MMHG | HEIGHT: 62 IN | TEMPERATURE: 98 F | OXYGEN SATURATION: 94 % | WEIGHT: 149.91 LBS

## 2018-11-17 DIAGNOSIS — Z95.1 PRESENCE OF AORTOCORONARY BYPASS GRAFT: Chronic | ICD-10-CM

## 2018-11-17 DIAGNOSIS — E78.5 HYPERLIPIDEMIA, UNSPECIFIED: ICD-10-CM

## 2018-11-17 DIAGNOSIS — I10 ESSENTIAL (PRIMARY) HYPERTENSION: ICD-10-CM

## 2018-11-17 DIAGNOSIS — Z98.1 ARTHRODESIS STATUS: Chronic | ICD-10-CM

## 2018-11-17 DIAGNOSIS — R07.9 CHEST PAIN, UNSPECIFIED: ICD-10-CM

## 2018-11-17 LAB
ALBUMIN SERPL ELPH-MCNC: 4.3 G/DL — SIGNIFICANT CHANGE UP (ref 3.3–5)
ALP SERPL-CCNC: 105 U/L — SIGNIFICANT CHANGE UP (ref 40–120)
ALT FLD-CCNC: 12 U/L — SIGNIFICANT CHANGE UP (ref 10–45)
ANION GAP SERPL CALC-SCNC: 12 MMOL/L — SIGNIFICANT CHANGE UP (ref 5–17)
AST SERPL-CCNC: 15 U/L — SIGNIFICANT CHANGE UP (ref 10–40)
BASOPHILS NFR BLD AUTO: 0.1 % — SIGNIFICANT CHANGE UP (ref 0–2)
BILIRUB SERPL-MCNC: 0.5 MG/DL — SIGNIFICANT CHANGE UP (ref 0.2–1.2)
BUN SERPL-MCNC: 14 MG/DL — SIGNIFICANT CHANGE UP (ref 7–23)
CALCIUM SERPL-MCNC: 12.7 MG/DL — HIGH (ref 8.4–10.5)
CHLORIDE SERPL-SCNC: 106 MMOL/L — SIGNIFICANT CHANGE UP (ref 96–108)
CK MB CFR SERPL CALC: 1.1 NG/ML — SIGNIFICANT CHANGE UP (ref 0–6.7)
CO2 SERPL-SCNC: 23 MMOL/L — SIGNIFICANT CHANGE UP (ref 22–31)
CREAT SERPL-MCNC: 0.98 MG/DL — SIGNIFICANT CHANGE UP (ref 0.5–1.3)
CRP SERPL-MCNC: 0.51 MG/DL — HIGH (ref 0–0.4)
EOSINOPHIL NFR BLD AUTO: 1.3 % — SIGNIFICANT CHANGE UP (ref 0–6)
ERYTHROCYTE [SEDIMENTATION RATE] IN BLOOD: 9 MM/HR — SIGNIFICANT CHANGE UP
GLUCOSE SERPL-MCNC: 190 MG/DL — HIGH (ref 70–99)
HCT VFR BLD CALC: 37.5 % — LOW (ref 39–50)
HGB BLD-MCNC: 12.2 G/DL — LOW (ref 13–17)
LYMPHOCYTES # BLD AUTO: 27 % — SIGNIFICANT CHANGE UP (ref 13–44)
MCHC RBC-ENTMCNC: 26.7 PG — LOW (ref 27–34)
MCHC RBC-ENTMCNC: 32.5 G/DL — SIGNIFICANT CHANGE UP (ref 32–36)
MCV RBC AUTO: 82.1 FL — SIGNIFICANT CHANGE UP (ref 80–100)
MONOCYTES NFR BLD AUTO: 6.8 % — SIGNIFICANT CHANGE UP (ref 2–14)
NEUTROPHILS NFR BLD AUTO: 64.8 % — SIGNIFICANT CHANGE UP (ref 43–77)
PLATELET # BLD AUTO: 305 K/UL — SIGNIFICANT CHANGE UP (ref 150–400)
POTASSIUM SERPL-MCNC: 4 MMOL/L — SIGNIFICANT CHANGE UP (ref 3.5–5.3)
POTASSIUM SERPL-SCNC: 4 MMOL/L — SIGNIFICANT CHANGE UP (ref 3.5–5.3)
PROT SERPL-MCNC: 7.2 G/DL — SIGNIFICANT CHANGE UP (ref 6–8.3)
RAPID RVP RESULT: SIGNIFICANT CHANGE UP
RBC # BLD: 4.57 M/UL — SIGNIFICANT CHANGE UP (ref 4.2–5.8)
RBC # FLD: 13.6 % — SIGNIFICANT CHANGE UP (ref 10.3–16.9)
SODIUM SERPL-SCNC: 141 MMOL/L — SIGNIFICANT CHANGE UP (ref 135–145)
TROPONIN T SERPL-MCNC: <0.01 NG/ML — SIGNIFICANT CHANGE UP (ref 0–0.01)
WBC # BLD: 7.9 K/UL — SIGNIFICANT CHANGE UP (ref 3.8–10.5)
WBC # FLD AUTO: 7.9 K/UL — SIGNIFICANT CHANGE UP (ref 3.8–10.5)

## 2018-11-17 PROCEDURE — 93010 ELECTROCARDIOGRAM REPORT: CPT

## 2018-11-17 PROCEDURE — 71046 X-RAY EXAM CHEST 2 VIEWS: CPT | Mod: 26

## 2018-11-17 PROCEDURE — 99285 EMERGENCY DEPT VISIT HI MDM: CPT | Mod: 25

## 2018-11-17 RX ORDER — METOPROLOL TARTRATE 50 MG
25 TABLET ORAL
Qty: 0 | Refills: 0 | Status: DISCONTINUED | OUTPATIENT
Start: 2018-11-17 | End: 2018-11-18

## 2018-11-17 RX ORDER — AMLODIPINE BESYLATE 2.5 MG/1
2.5 TABLET ORAL DAILY
Qty: 0 | Refills: 0 | Status: DISCONTINUED | OUTPATIENT
Start: 2018-11-17 | End: 2018-11-18

## 2018-11-17 RX ORDER — ACETAMINOPHEN 500 MG
650 TABLET ORAL ONCE
Qty: 0 | Refills: 0 | Status: COMPLETED | OUTPATIENT
Start: 2018-11-17 | End: 2018-11-17

## 2018-11-17 RX ORDER — ATORVASTATIN CALCIUM 80 MG/1
40 TABLET, FILM COATED ORAL AT BEDTIME
Qty: 0 | Refills: 0 | Status: DISCONTINUED | OUTPATIENT
Start: 2018-11-17 | End: 2018-11-18

## 2018-11-17 RX ORDER — OXYCODONE AND ACETAMINOPHEN 5; 325 MG/1; MG/1
1 TABLET ORAL EVERY 4 HOURS
Qty: 0 | Refills: 0 | Status: DISCONTINUED | OUTPATIENT
Start: 2018-11-17 | End: 2018-11-18

## 2018-11-17 RX ORDER — PANTOPRAZOLE SODIUM 20 MG/1
40 TABLET, DELAYED RELEASE ORAL
Qty: 0 | Refills: 0 | Status: DISCONTINUED | OUTPATIENT
Start: 2018-11-17 | End: 2018-11-18

## 2018-11-17 RX ORDER — NITROGLYCERIN 6.5 MG
0.4 CAPSULE, EXTENDED RELEASE ORAL ONCE
Qty: 0 | Refills: 0 | Status: COMPLETED | OUTPATIENT
Start: 2018-11-17 | End: 2018-11-17

## 2018-11-17 RX ORDER — CLOPIDOGREL BISULFATE 75 MG/1
75 TABLET, FILM COATED ORAL DAILY
Qty: 0 | Refills: 0 | Status: DISCONTINUED | OUTPATIENT
Start: 2018-11-17 | End: 2018-11-18

## 2018-11-17 RX ORDER — DOCUSATE SODIUM 100 MG
100 CAPSULE ORAL THREE TIMES A DAY
Qty: 0 | Refills: 0 | Status: DISCONTINUED | OUTPATIENT
Start: 2018-11-17 | End: 2018-11-18

## 2018-11-17 RX ORDER — HEPARIN SODIUM 5000 [USP'U]/ML
5000 INJECTION INTRAVENOUS; SUBCUTANEOUS EVERY 8 HOURS
Qty: 0 | Refills: 0 | Status: DISCONTINUED | OUTPATIENT
Start: 2018-11-17 | End: 2018-11-18

## 2018-11-17 RX ORDER — ASPIRIN/CALCIUM CARB/MAGNESIUM 324 MG
81 TABLET ORAL DAILY
Qty: 0 | Refills: 0 | Status: DISCONTINUED | OUTPATIENT
Start: 2018-11-17 | End: 2018-11-18

## 2018-11-17 RX ORDER — SENNA PLUS 8.6 MG/1
1 TABLET ORAL AT BEDTIME
Qty: 0 | Refills: 0 | Status: DISCONTINUED | OUTPATIENT
Start: 2018-11-17 | End: 2018-11-18

## 2018-11-17 RX ORDER — COLCHICINE 0.6 MG
0.6 TABLET ORAL
Qty: 0 | Refills: 0 | Status: DISCONTINUED | OUTPATIENT
Start: 2018-11-17 | End: 2018-11-18

## 2018-11-17 RX ADMIN — AMLODIPINE BESYLATE 2.5 MILLIGRAM(S): 2.5 TABLET ORAL at 19:00

## 2018-11-17 RX ADMIN — Medication 650 MILLIGRAM(S): at 12:43

## 2018-11-17 RX ADMIN — Medication 0.4 MILLIGRAM(S): at 17:46

## 2018-11-17 RX ADMIN — HEPARIN SODIUM 5000 UNIT(S): 5000 INJECTION INTRAVENOUS; SUBCUTANEOUS at 22:28

## 2018-11-17 RX ADMIN — Medication 0.6 MILLIGRAM(S): at 22:29

## 2018-11-17 RX ADMIN — OXYCODONE AND ACETAMINOPHEN 1 TABLET(S): 5; 325 TABLET ORAL at 23:33

## 2018-11-17 RX ADMIN — Medication 650 MILLIGRAM(S): at 13:07

## 2018-11-17 RX ADMIN — SENNA PLUS 1 TABLET(S): 8.6 TABLET ORAL at 22:28

## 2018-11-17 RX ADMIN — Medication 25 MILLIGRAM(S): at 19:00

## 2018-11-17 RX ADMIN — CLOPIDOGREL BISULFATE 75 MILLIGRAM(S): 75 TABLET, FILM COATED ORAL at 19:00

## 2018-11-17 RX ADMIN — ATORVASTATIN CALCIUM 40 MILLIGRAM(S): 80 TABLET, FILM COATED ORAL at 22:28

## 2018-11-17 RX ADMIN — Medication 81 MILLIGRAM(S): at 11:33

## 2018-11-17 RX ADMIN — Medication 100 MILLIGRAM(S): at 22:28

## 2018-11-17 NOTE — ED ADULT NURSE NOTE - NSIMPLEMENTINTERV_GEN_ALL_ED
Implemented All Fall Risk Interventions:  [place] to call system. Call bell, personal items and telephone within reach. Instruct patient to call for assistance. Room bathroom lighting operational. Non-slip footwear when patient is off stretcher. Physically safe environment: no spills, clutter or unnecessary equipment. Stretcher in lowest position, wheels locked, appropriate side rails in place. Provide visual cue, wrist band, yellow gown, etc. Monitor gait and stability. Monitor for mental status changes and reorient to person, place, and time. Review medications for side effects contributing to fall risk. Reinforce activity limits and safety measures with patient and family.

## 2018-11-17 NOTE — H&P ADULT - HISTORY OF PRESENT ILLNESS
Patient is a 54 y/o male with PMHx of known CAD (s/p Patient is a 52 y/o former 2.5 pack year smoker (quit 5 months ago) male with PMHx of known CAD (s/p CABG x3 LIMA-LAD, RAD-OM, SVG-CAMPBELL), HTN, HLD, Pre-diabetes who presented to [facility] ED 11/17/2018 c/o     In ED VS: BP: 167/103, HR 89, RR 20, Temp 98.3F, O2 94%. EKG revealed new T-wave flattening in leads I and aVL, CXR unremarkable, Labs Trops x1 negative Ca 12.7. In ED pt given Tylenol PO for pain. Upon examination pt ---    Pt now admitted to [facility] for serial Alonso, Echo, and further cardiac workup Patient is a 54 y/o former 2.5 pack year smoker (quit 5 months ago) male with PMHx of known CAD (s/p CABG x3 LIMA-LAD, RAD-OM, SVG-CAMPBELL), HTN, HLD, Pre-diabetes, hyperparathyroidism who presented to [facility] ED 11/17/2018 c/o non-exertional left sided chest tightness radiating to neck/shoulder/ and back. Pt reports that pain is similar to pain prior to CABG. Pt endorses taking Tylenol for pain with no resolution of symptoms, and decided to come in to ED. Per patient pain started a few weeks s/p surgery but was at worst today with no alleviation of pain. Pt denies any associated symptoms such as SOB, nausea, diaphoresis, palpitations, syncope. Pt also denies PND/orthopnea, LEedema, GONSALES, decrease in exercise tolerance, recent illness/travel. In ED VS: BP: 167/103, HR 89, RR 20, Temp 98.3F, O2 94%. EKG revealed new T-wave flattening in leads I and aVL, CXR unremarkable, Labs Trops x1 negative Ca 12.7. In ED pt given Tylenol PO for pain. Upon examination pt with continued CP given SL NTG with improvement of symptoms.     Pt now admitted to [facility] for serial Alonso, Echo, and further cardiac workup Patient is a 54 y/o former 2.5 pack year smoker (quit 5 months ago) male with PMHx of known CAD (s/p CABG x3 LIMA-LAD, RAD-OM, SVG-CAMPBELL), HTN, HLD, Pre-diabetes, hyperparathyroidism who presented to [facility] ED 11/17/2018 c/o non-exertional left sided chest tightness radiating to neck/shoulder/ and back worsened with deep inspiration, denies CP is improved with different positions. Pt reports that pain is similar to pain prior to CABG. Pt endorses taking Tylenol for pain with no resolution of symptoms, and decided to come in to ED. Per patient pain started a few weeks s/p surgery but was at worst today with no alleviation of pain. Pt denies any associated symptoms such as SOB, nausea, diaphoresis, palpitations, syncope. Pt also denies PND/orthopnea, LEedema, GONSALES, decrease in exercise tolerance, recent illness/travel. In ED VS: BP: 167/103, HR 89, RR 20, Temp 98.3F, O2 94%. EKG revealed new T-wave flattening in leads I and aVL, CXR unremarkable, Labs Trops x1 negative Ca 12.7. In ED pt given Tylenol PO for pain. Upon examination pt with continued CP given SL NTG with improvement of symptoms.     Pt now admitted to [facility] [place] for serial Alonso, Echo, and further cardiac workup

## 2018-11-17 NOTE — ED PROVIDER NOTE - OBJECTIVE STATEMENT
53M with concern for chest pain hx of CABG 2 months ago presenting with L sided chest pain radiating to neck some nausea, no shortness of breath, no diaphoresis. Pt has htn, dm prediabetes only, hl.

## 2018-11-17 NOTE — H&P ADULT - NSHPLABSRESULTS_GEN_ALL_CORE
12.2   7.9   )-----------( 305      ( 17 Nov 2018 11:39 )             37.5       11-17    141  |  106  |  14  ----------------------------<  190<H>  4.0   |  23  |  0.98    Ca    12.7<H>      17 Nov 2018 11:39    TPro  7.2  /  Alb  4.3  /  TBili  0.5  /  DBili  x   /  AST  15  /  ALT  12  /  AlkPhos  105  11-17      CARDIAC MARKERS ( 17 Nov 2018 11:39 )  x     / <0.01 ng/mL / 48 U/L / x     / 1.1 ng/mL

## 2018-11-17 NOTE — ED ADULT TRIAGE NOTE - OTHER COMPLAINTS
Pt also C/O cough, fever. Last Tylenol taken 6AM. Pt also C/O cough, fever. Last Tylenol taken 6AM. EKG in progress.

## 2018-11-17 NOTE — ED PROVIDER NOTE - MEDICAL DECISION MAKING DETAILS
53M with chest pain hx of CABG presenting with chest pain today. Initial troponin negative EKG shows nonspecific changes will admit for further w/u Doubt aortic dissection no radiation of pain to back, pt appears comfortable at this time.

## 2018-11-17 NOTE — ED PROVIDER NOTE - PHYSICAL EXAMINATION
gen: no acute distress, comfortable, conversant  HEENT: oropharynx clear  Neck: supple, no meningismus, no bruit noted bl carotid  Chest wall: scar c/d/i   CV: rrr no m/r/g 2+ radial pulse  Resp: ctab, no w/c/r  Abd: nontender, no rebound/guarding  Ext: no edema, pedal pulses 2+  Neuro: alert and oriented, cn grossly intact, strength equal in all 4 ext, sensation intact to light touch f/a/l, nl coordination, gait steady

## 2018-11-17 NOTE — H&P ADULT - ASSESSMENT
Patient is a 54 y/o former 2.5 pack year smoker (quit 5 months ago) male with PMHx of known CAD (s/p CABG x3 LIMA-LAD, RAD-OM, SVG-CAMPBELL), HTN, HLD, Pre-diabetes who is admitted for further cardiac workup of chest pain

## 2018-11-17 NOTE — H&P ADULT - PROBLEM SELECTOR PLAN 1
-During exam pt c/o CP given SL NTG with relief  -EKG with new changes T wave flattening in lead I and aVL  -Trops x1 negative f/u trops @6pm and 10pm  -Recent CABG in 9/2018 w/ Dr. Reddy  -continue ASA/Plavix/Statin/BB -During exam pt c/o CP given SL NTG with relief  -EKG with new changes T wave flattening in lead I and aVL, CXR unremarkable f/u official read  -RVP negative  -Trops x1 negative f/u trops @6pm and 10pm  -Recent CABG in 9/2018 w/ Dr. Reddy  -continue ASA/Plavix/Statin/BB -During exam pt c/o CP given SL NTG with relief  -EKG with new changes T wave flattening in lead I and aVL, CXR unremarkable f/u official read  -RVP negative  -Trops x1 negative f/u trops @6pm and 10pm, f/u ESR/CRP  -Recent CABG in 9/2018 w/ Dr. Reddy, please make CTS aware of pt's admission  -continue ASA/Plavix/Statin/BB  -Echo 09/04/2018: EF 64%, trace MR/TR/OR, mild LVH. ECHO ordered f/u  -Repeat EKG in AM  -Started on Colchicine 0.6mg BID

## 2018-11-17 NOTE — H&P ADULT - PROBLEM SELECTOR PLAN 3
-continue atorvastatin 40mg QHS      DVT PPx: [facility]  Dispo:      Case d/w Dr. Crain -continue atorvastatin 40mg QHS      DVT PPx: [facility]  Dispo: CTS consult in AM, Echo ordered, f/u trops, esr/crp       Case d/w Dr. Crain

## 2018-11-17 NOTE — ED ADULT NURSE NOTE - OBJECTIVE STATEMENT
Patient reports incidence of chest pain that is not intermittent with exacerbation and increase of pain upon abduction of the left upper extremity. Patient presents with weakness in increasing progression as well as cough and congestion. No fever or chills reported at home

## 2018-11-18 ENCOUNTER — TRANSCRIPTION ENCOUNTER (OUTPATIENT)
Age: 53
End: 2018-11-18

## 2018-11-18 VITALS — TEMPERATURE: 99 F

## 2018-11-18 LAB
ANION GAP SERPL CALC-SCNC: 8 MMOL/L — SIGNIFICANT CHANGE UP (ref 5–17)
BUN SERPL-MCNC: 16 MG/DL — SIGNIFICANT CHANGE UP (ref 7–23)
CALCIUM SERPL-MCNC: 12.7 MG/DL — HIGH (ref 8.4–10.5)
CHLORIDE SERPL-SCNC: 104 MMOL/L — SIGNIFICANT CHANGE UP (ref 96–108)
CO2 SERPL-SCNC: 24 MMOL/L — SIGNIFICANT CHANGE UP (ref 22–31)
CREAT SERPL-MCNC: 1.14 MG/DL — SIGNIFICANT CHANGE UP (ref 0.5–1.3)
GLUCOSE SERPL-MCNC: 116 MG/DL — HIGH (ref 70–99)
HCT VFR BLD CALC: 37.5 % — LOW (ref 39–50)
HGB BLD-MCNC: 11.9 G/DL — LOW (ref 13–17)
MAGNESIUM SERPL-MCNC: 2.6 MG/DL — SIGNIFICANT CHANGE UP (ref 1.6–2.6)
MCHC RBC-ENTMCNC: 26.6 PG — LOW (ref 27–34)
MCHC RBC-ENTMCNC: 31.7 G/DL — LOW (ref 32–36)
MCV RBC AUTO: 83.7 FL — SIGNIFICANT CHANGE UP (ref 80–100)
PLATELET # BLD AUTO: 280 K/UL — SIGNIFICANT CHANGE UP (ref 150–400)
POTASSIUM SERPL-MCNC: 3.8 MMOL/L — SIGNIFICANT CHANGE UP (ref 3.5–5.3)
POTASSIUM SERPL-SCNC: 3.8 MMOL/L — SIGNIFICANT CHANGE UP (ref 3.5–5.3)
RBC # BLD: 4.48 M/UL — SIGNIFICANT CHANGE UP (ref 4.2–5.8)
RBC # FLD: 13.8 % — SIGNIFICANT CHANGE UP (ref 10.3–16.9)
SODIUM SERPL-SCNC: 136 MMOL/L — SIGNIFICANT CHANGE UP (ref 135–145)
WBC # BLD: 6.8 K/UL — SIGNIFICANT CHANGE UP (ref 3.8–10.5)
WBC # FLD AUTO: 6.8 K/UL — SIGNIFICANT CHANGE UP (ref 3.8–10.5)

## 2018-11-18 PROCEDURE — 93306 TTE W/DOPPLER COMPLETE: CPT | Mod: 26

## 2018-11-18 PROCEDURE — 93010 ELECTROCARDIOGRAM REPORT: CPT

## 2018-11-18 PROCEDURE — 99220: CPT

## 2018-11-18 RX ORDER — ASPIRIN/CALCIUM CARB/MAGNESIUM 324 MG
650 TABLET ORAL THREE TIMES A DAY
Qty: 0 | Refills: 0 | Status: DISCONTINUED | OUTPATIENT
Start: 2018-11-18 | End: 2018-11-18

## 2018-11-18 RX ORDER — ASPIRIN/CALCIUM CARB/MAGNESIUM 324 MG
2 TABLET ORAL
Qty: 0 | Refills: 0 | COMMUNITY
Start: 2018-11-18

## 2018-11-18 RX ORDER — LOSARTAN POTASSIUM 100 MG/1
25 TABLET, FILM COATED ORAL DAILY
Qty: 0 | Refills: 0 | Status: DISCONTINUED | OUTPATIENT
Start: 2018-11-18 | End: 2018-11-18

## 2018-11-18 RX ORDER — LOSARTAN POTASSIUM 100 MG/1
1 TABLET, FILM COATED ORAL
Qty: 30 | Refills: 3 | OUTPATIENT
Start: 2018-11-18 | End: 2019-03-17

## 2018-11-18 RX ORDER — IBUPROFEN 200 MG
600 TABLET ORAL ONCE
Qty: 0 | Refills: 0 | Status: COMPLETED | OUTPATIENT
Start: 2018-11-18 | End: 2018-11-18

## 2018-11-18 RX ORDER — ASPIRIN/CALCIUM CARB/MAGNESIUM 324 MG
2 TABLET ORAL
Qty: 180 | Refills: 0
Start: 2018-11-18 | End: 2018-12-17

## 2018-11-18 RX ORDER — IBUPROFEN 200 MG
1 TABLET ORAL
Qty: 0 | Refills: 0 | COMMUNITY

## 2018-11-18 RX ORDER — LOSARTAN POTASSIUM 100 MG/1
1 TABLET, FILM COATED ORAL
Qty: 30 | Refills: 3
Start: 2018-11-18 | End: 2019-03-17

## 2018-11-18 RX ORDER — FENOFIBRATE,MICRONIZED 130 MG
0 CAPSULE ORAL
Qty: 0 | Refills: 0 | COMMUNITY

## 2018-11-18 RX ADMIN — Medication 100 MILLIGRAM(S): at 06:09

## 2018-11-18 RX ADMIN — HEPARIN SODIUM 5000 UNIT(S): 5000 INJECTION INTRAVENOUS; SUBCUTANEOUS at 13:21

## 2018-11-18 RX ADMIN — OXYCODONE AND ACETAMINOPHEN 1 TABLET(S): 5; 325 TABLET ORAL at 00:30

## 2018-11-18 RX ADMIN — Medication 600 MILLIGRAM(S): at 07:41

## 2018-11-18 RX ADMIN — Medication 100 MILLIGRAM(S): at 13:21

## 2018-11-18 RX ADMIN — Medication 25 MILLIGRAM(S): at 06:09

## 2018-11-18 RX ADMIN — AMLODIPINE BESYLATE 2.5 MILLIGRAM(S): 2.5 TABLET ORAL at 06:09

## 2018-11-18 RX ADMIN — Medication 650 MILLIGRAM(S): at 13:48

## 2018-11-18 RX ADMIN — HEPARIN SODIUM 5000 UNIT(S): 5000 INJECTION INTRAVENOUS; SUBCUTANEOUS at 06:09

## 2018-11-18 RX ADMIN — LOSARTAN POTASSIUM 25 MILLIGRAM(S): 100 TABLET, FILM COATED ORAL at 13:19

## 2018-11-18 RX ADMIN — PANTOPRAZOLE SODIUM 40 MILLIGRAM(S): 20 TABLET, DELAYED RELEASE ORAL at 06:09

## 2018-11-18 RX ADMIN — CLOPIDOGREL BISULFATE 75 MILLIGRAM(S): 75 TABLET, FILM COATED ORAL at 12:55

## 2018-11-18 RX ADMIN — Medication 81 MILLIGRAM(S): at 12:55

## 2018-11-18 RX ADMIN — Medication 0.6 MILLIGRAM(S): at 10:40

## 2018-11-18 RX ADMIN — Medication 600 MILLIGRAM(S): at 06:52

## 2018-11-18 NOTE — DISCHARGE NOTE ADULT - MEDICATION SUMMARY - MEDICATIONS TO TAKE
I will START or STAY ON the medications listed below when I get home from the hospital:    aspirin 325 mg oral tablet  -- 2 tab(s) by mouth 3 times a day, As needed, Pain  -- Indication: For CHEST PAIN    losartan 25 mg oral tablet  -- 1 tab(s) by mouth once a day  -- Indication: For Blood pressure control    atorvastatin 40 mg oral tablet  -- 1 tab(s) by mouth once a day (at bedtime)  -- Indication: For CHolesterol control    clopidogrel 75 mg oral tablet  -- 1 tab(s) by mouth once a day  -- Indication: For CAD (coronary artery disease)/keeping blood flowing to your heart    metoprolol tartrate 25 mg oral tablet  -- 1.5 tab(s) by mouth 2 times a day   -- Indication: For Blood pressure control    amLODIPine 2.5 mg oral tablet  -- 1 tab(s) by mouth once a day  -- Indication: For Blood pressure control    polyethylene glycol 3350 oral powder for reconstitution  -- 17 gram(s) by mouth once a day as needed   -- Indication: For Laxative    docusate sodium 100 mg oral capsule  -- 1 cap(s) by mouth 3 times a day -for constipation as needed  -- Indication: For Laxative    senna oral tablet  -- 1 tab(s) by mouth once a day (at bedtime) -for constipation as needed  -- Indication: For Laxative    pantoprazole 40 mg oral delayed release tablet  -- 1 tab(s) by mouth once a day (before a meal)  -- Indication: For Stomach acid control

## 2018-11-18 NOTE — PROVIDER CONTACT NOTE (OTHER) - BACKGROUND
Patient A&Ox3 admitted for chest pain. Troponin negative x2. Third troponin pending. patient received nitro 0.4mg with no relief.

## 2018-11-18 NOTE — DISCHARGE NOTE ADULT - PLAN OF CARE
You have blockages in the blood vessels that give blood and oxygen to your heart. This is called CORONARY ARTERY DISEASE. You had open heart surgery (CABG) with Dr Raffy Reddy on 9/10/18 and had 3 bypass grafts to your heart. You came to the hospital with chest pain when you lay flat and with palpation. You do not have elevated heart enzymes and your EKG is largely unchanged. You are not having a heart attack. Likely the chest pain you are experiencing is a small amount of inflammation in the lining around your heart in combination with chest muscle pain. START taking ASPIRIN 650mg THREE times a day as need for chest pain. START taking PANTOPRAZOLE (PROTONIX) 40mg once daily to help avoid the ASPIRIN causing stomach irritation. CONTINUE taking PLAVIX (CLOPIDOGREL) 75mg once daily to keep blood flowing well to your heart arteries. Follow up with your cardiologist, Dr Reddy in 1 week START taking LOSARTAN 25mg once daily. This will help control your blood pressure and help your heart to continue to pump strongly. CONTINUE taking METOPROLOL TARTRATE at home dose. CONTINUE taking ATORVASTATIN 40mg once daily and FENOFIBRATE 145mg daily for cholesterol control Follow up with your cardiologist, Dr Reddy as well as Dr Raffy Reddy in 1-2 weeks. Your calcium levels are still elevated. You have been diagnosed with HYPERPARATHYROIDISM. Continue to follow up your endocrinologist to help better control your condition

## 2018-11-18 NOTE — DISCHARGE NOTE ADULT - PATIENT PORTAL LINK FT
You can access the [facility] Patient Portal, offered by [facility], by registering with the following website: [email or web address]

## 2018-11-18 NOTE — DISCHARGE NOTE ADULT - HOSPITAL COURSE
54 y/o former 2.5 pack year smoker (quit 5 months ago) male with PMHx of known CAD (s/p CABG x3 LIMA-LAD, RAD-OM, SVG-CAMPBELL), HTN, HLD, Pre-diabetes, hyperparathyroidism who presented to [facility] ED 11/17/2018 c/o non-exertional left sided chest tightness radiating to neck/shoulder/ and back worsened with deep inspiration, denies CP is improved with different positions. Pt reports that pain is similar to pain prior to CABG. Pt endorses taking Tylenol for pain with no resolution of symptoms, and decided to come in to ED. Per patient pain started a few weeks s/p surgery but was at worst today with no alleviation of pain. Pt denies any associated symptoms such as SOB, nausea, diaphoresis, palpitations, syncope. Pt also denies PND/orthopnea, LEedema, GONSALES, decrease in exercise tolerance, recent illness/travel. In ED VS: BP: 167/103, HR 89, RR 20, Temp 98.3F, O2 94%. EKG revealed new T-wave flattening in leads I and aVL, CXR unremarkable, Labs Trops x1 negative Ca 12.7. In ED pt given Tylenol PO for pain. Upon examination pt with continued CP given SL NTG with improvement of symptoms. EKG NSR with T wave flattening and TWI V1. CXR unremarkable. RVP negative. Trop neg x 3. ESR normal. CRP elevated 0.51. Recent CABG in 9/10/18 w/ Dr. Reddy, please make CTS -OM, SVG-PDA. Continue ASA/Plavix/Statin/BB. Echo 09/04/2018: EF 64%, trace MR/TR/GA, mild LVH. Given Colchicine 0.6mg BID. For pt's SBP in 160s pt was continued on Amlodipine 2.5mg daily and Lopressor 25mg BID. Pt started on Losartan 25mg daily. Pt to continue taking Atorvastatin 40mg daily and Fenofibrate 145mg daily. CTS consulted and .....     Pt stable for discharge as per Dr Crain. Pt will follow up with his primary cardiologist, Dr Reddy in 1-2 weeks. 54 y/o former 2.5 pack year smoker (quit 5 months ago) male with PMHx of known CAD (s/p CABG x3 LIMA-LAD, RAD-OM, SVG-CAMPBELL), HTN, HLD, Pre-diabetes, hyperparathyroidism who presented to [facility] ED 11/17/2018 c/o non-exertional left sided chest tightness radiating to neck/shoulder/ and back worsened with deep inspiration, denies CP is improved with different positions. Pt reports that pain is similar to pain prior to CABG. Pt endorses taking Tylenol for pain with no resolution of symptoms, and decided to come in to ED. Per patient pain started a few weeks s/p surgery but was at worst today with no alleviation of pain. Pt denies any associated symptoms such as SOB, nausea, diaphoresis, palpitations, syncope. Pt also denies PND/orthopnea, LEedema, GONSALES, decrease in exercise tolerance, recent illness/travel. In ED VS: BP: 167/103, HR 89, RR 20, Temp 98.3F, O2 94%. EKG revealed new T-wave flattening in leads I and aVL, CXR unremarkable, Labs Trops x1 negative Ca 12.7. In ED pt given Tylenol PO for pain. Upon examination pt with continued CP given SL NTG with improvement of symptoms. EKG NSR with T wave flattening and TWI V1. CXR unremarkable. RVP negative. Trop neg x 3. ESR normal. CRP elevated 0.51. Recent CABG in 9/10/18 w/ Dr. Reddy, please make CTS -OM, SVG-PDA. Continue ASA/Plavix/Statin/BB. Echo 09/04/2018: EF 64%, trace MR/TR/IN, mild LVH. Given Colchicine 0.6mg BID. For pt's SBP in 160s pt was continued on Amlodipine 2.5mg daily and Lopressor 25mg BID. Pt started on Losartan 25mg daily. Pt to continue taking Atorvastatin 40mg daily and Fenofibrate 145mg daily. CTS consulted and after bedside Echo by CCU fellow was negative for pericardial effusion agrees pt is stable for discharge. Pt stable for discharge as per Dr Crain. Pt will follow up with his primary cardiologist, Dr Reddy in 1-2 weeks as well as Dr Raffy Reddy (CT surgeon) in 1-2 weeks.

## 2018-11-18 NOTE — DISCHARGE NOTE ADULT - CARE PROVIDERS DIRECT ADDRESSES
[email or web address]

## 2018-11-18 NOTE — DISCHARGE NOTE ADULT - CARE PLAN
Principal Discharge DX:	Chest pain  Goal:	You have blockages in the blood vessels that give blood and oxygen to your heart. This is called CORONARY ARTERY DISEASE. You had open heart surgery (CABG) with Dr Raffy Reddy on 9/10/18 and had 3 bypass grafts to your heart. You came to the hospital with chest pain when you lay flat and with palpation. You do not have elevated heart enzymes and your EKG is largely unchanged. You are not having a heart attack. Likely the chest pain you are experiencing is a small amount of inflammation in the lining around your heart in combination with chest muscle pain. START taking ASPIRIN 650mg THREE times a day as need for chest pain. START taking PANTOPRAZOLE (PROTONIX) 40mg once daily to help avoid the ASPIRIN causing stomach irritation. CONTINUE taking PLAVIX (CLOPIDOGREL) 75mg once daily to keep blood flowing well to your heart arteries.  Assessment and plan of treatment:	Follow up with your cardiologist, Dr Reddy in 1 week  Secondary Diagnosis:	HTN (hypertension)  Goal:	START taking LOSARTAN 25mg once daily. This will help control your blood pressure and help your heart to continue to pump strongly. CONTINUE taking METOPROLOL TARTRATE at home dose.  Secondary Diagnosis:	HLD (hyperlipidemia)  Goal:	CONTINUE taking ATORVASTATIN 40mg once daily and FENOFIBRATE 145mg daily for cholesterol control Principal Discharge DX:	Chest pain  Goal:	You have blockages in the blood vessels that give blood and oxygen to your heart. This is called CORONARY ARTERY DISEASE. You had open heart surgery (CABG) with Dr Raffy Reddy on 9/10/18 and had 3 bypass grafts to your heart. You came to the hospital with chest pain when you lay flat and with palpation. You do not have elevated heart enzymes and your EKG is largely unchanged. You are not having a heart attack. Likely the chest pain you are experiencing is a small amount of inflammation in the lining around your heart in combination with chest muscle pain. START taking ASPIRIN 650mg THREE times a day as need for chest pain. START taking PANTOPRAZOLE (PROTONIX) 40mg once daily to help avoid the ASPIRIN causing stomach irritation. CONTINUE taking PLAVIX (CLOPIDOGREL) 75mg once daily to keep blood flowing well to your heart arteries.  Assessment and plan of treatment:	Follow up with your cardiologist, Dr Reddy as well as Dr Raffy Reddy in 1-2 weeks.  Secondary Diagnosis:	HTN (hypertension)  Goal:	START taking LOSARTAN 25mg once daily. This will help control your blood pressure and help your heart to continue to pump strongly. CONTINUE taking METOPROLOL TARTRATE at home dose.  Secondary Diagnosis:	HLD (hyperlipidemia)  Goal:	CONTINUE taking ATORVASTATIN 40mg once daily and FENOFIBRATE 145mg daily for cholesterol control  Secondary Diagnosis:	Hyperparathyroidism  Goal:	Your calcium levels are still elevated. You have been diagnosed with HYPERPARATHYROIDISM. Continue to follow up your endocrinologist to help better control your condition

## 2018-11-18 NOTE — CONSULT NOTE ADULT - ASSESSMENT
A/P: 54 y/o M s/p OPCABx3 on 9/10/18 readmitted with chest pain   - chest pain reproducible to palpation, positional with sitting up and leaning forward, and improved with motrin suggesting a combination of incisional pain and pericarditis   - trops negative x 3   - repeat echo   - c/w NSAIDS with GI ppx and may follow up as outpatient if echo is normal.

## 2018-11-18 NOTE — CONSULT NOTE ADULT - SUBJECTIVE AND OBJECTIVE BOX
Surgeon: Dr. Reddy    Requesting Physician: Dr. Casas    HISTORY OF PRESENT ILLNESS:    Pt reports having non-radiating constant left sided chest pain since surgery, exacerbated by sitting up or leaning forward.  He presented to the ED with worsening of these same symptoms.  He denies any radiation of the pain down the arm up the neck.  He denies any improvement in symtpoms after recieving sublingual nitro in ED and states these symptoms were not similar to his anginal symptoms pre-op.  He did get some relief from the pain after receiving motrin.  He denies any associated SOB, dyspnea, palpitations, nausea/vomitting, or diaphoresis.     PAST MEDICAL & SURGICAL HISTORY:  CAD (coronary artery disease)  Pre-diabetes  HLD (hyperlipidemia)  HTN (hypertension)  S/P CABG (coronary artery bypass graft)  History of lumbar spinal fusion      MEDICATIONS  (STANDING):  amLODIPine   Tablet 2.5 milliGRAM(s) Oral daily  atorvastatin 40 milliGRAM(s) Oral at bedtime  clopidogrel Tablet 75 milliGRAM(s) Oral daily  docusate sodium 100 milliGRAM(s) Oral three times a day  heparin  Injectable 5000 Unit(s) SubCutaneous every 8 hours  losartan 25 milliGRAM(s) Oral daily  metoprolol tartrate 25 milliGRAM(s) Oral two times a day  pantoprazole    Tablet 40 milliGRAM(s) Oral before breakfast  senna 1 Tablet(s) Oral at bedtime    MEDICATIONS  (PRN):  aspirin 650 milliGRAM(s) Oral three times a day PRN Pain      Allergies    No Known Allergies    Intolerances    SOCIAL HISTORY:  Smoker:   NO          ETOH use: NO                Ilicit Drug use:   NO    FAMILY HISTORY:  No pertinent family history in first degree relatives    Review of Systems  CONSTITUTIONAL:  Fevers / chills, sweats, fatigue, weight loss, weight gain                                    NEGATIVE  NEURO:  parathesias, seizures, syncope, confusion                                                                               NEGATIVE  EYES:  Blurry vision, discharge, pain, loss of vision                                                                                  NEGATIVE  ENMT:  Difficulty hearing, vertigo, dysphagia, epistaxis, recent dental work                                     NEGATIVE  CV:  Chest pain as per HPI, denies palpitations, GONSALES, orthopnea                                                         POSITIVE  RESPIRATORY:  Wheezing, SOB, cough / sputum, hemoptysis                                                              NEGATIVE  GI:  Nausea, vommiting, diarrhea, constipation, melena                                                                        NEGATIVE  : Hematuria, dysuria, urgency, incontinence                                                                                       NEGATIVE  MUSKULOSKELETAL:  arthritis, joint swelling, muscle weakness                                                           NEGATIVE  SKIN/BREAST:  rash, itching, quentin loss, masses                                                                                            NEGATIVE  PSYCH:  depresion, anxiety, suicidal ideation                                                                                             NEGATIVE  HEME/LYMPH:  bruises easily, enlarged lymph nodes, tender lymph nodes                                        NEGATIVE  ENDOCRINE:  cold intolerance, heat intolerance, polydipsia                                                                   NEGATIVE    PHYSICAL EXAM  Vital Signs Last 24 Hrs  T(C): 37.1 (18 Nov 2018 14:13), Max: 37.2 (17 Nov 2018 21:13)  T(F): 98.7 (18 Nov 2018 14:13), Max: 98.9 (17 Nov 2018 21:13)  HR: 67 (18 Nov 2018 12:59) (59 - 89)  BP: 169/94 (18 Nov 2018 12:59) (143/86 - 169/94)  BP(mean): 124 (17 Nov 2018 16:18) (124 - 124)  RR: 16 (18 Nov 2018 12:59) (16 - 20)  SpO2: 99% (18 Nov 2018 12:59) (94% - 100%)    CONSTITUTIONAL: WN/WD, NAD  NEURO: A&Ox3                    EYES: EOMI, sclera anicteric  ENMT: MMM  CV: S1S2 RRR  RESPIRATORY: CTA b/l no W/R/R  GI: soft NT/ND +BS  : NO GOTTLIEB  MUSKULOSKELETAL:  reproducible chest pain on palpation of left side of chest  SKIN / BREAST: MSI well healed, no errythema/induration    LABS:                        11.9   6.8   )-----------( 280      ( 18 Nov 2018 07:07 )             37.5     11-18    136  |  104  |  16  ----------------------------<  116<H>  3.8   |  24  |  1.14    Ca    12.7<H>      18 Nov 2018 07:07  Mg     2.6     11-18    TPro  7.2  /  Alb  4.3  /  TBili  0.5  /  DBili  x   /  AST  15  /  ALT  12  /  AlkPhos  105  11-17    CARDIAC MARKERS ( 17 Nov 2018 22:02 )  x     / <0.01 ng/mL / x     / x     / x      CARDIAC MARKERS ( 17 Nov 2018 18:49 )  x     / <0.01 ng/mL / x     / x     / x      CARDIAC MARKERS ( 17 Nov 2018 11:39 )  x     / <0.01 ng/mL / 48 U/L / x     / 1.1 ng/mL      RADIOLOGY & ADDITIONAL STUDIES:    CXR: no effusions/infiltrates/PTX    EKG: NSR with some flattening of T-waves in AVL and I    TTE / AYAKA: pending

## 2018-11-18 NOTE — DISCHARGE NOTE ADULT - MEDICATION SUMMARY - MEDICATIONS TO STOP TAKING
I will STOP taking the medications listed below when I get home from the hospital:    ibuprofen 800 mg oral tablet  -- 1 tab(s) by mouth 3 times a day, As Needed    oxyCODONE 10 mg oral tablet  -- 1 tab(s) by mouth every 8 hours, As Needed -Severe Pain (7 - 10) MDD:3    acetaminophen 325 mg oral tablet  -- 2 tab(s) by mouth every 8 hours, As Needed -Mild Pain (1 - 3) MDD:6

## 2018-11-18 NOTE — DISCHARGE NOTE ADULT - CARE PROVIDER_API CALL
Raffy Reddy), Cardiovascular Surgery  [address]  [address] [place] [address]  Phone: [phone]  Fax: [phone]    Markus Crain), Cardiology; Internal Medicine  [address] [address]  Phone: [phone]  Fax: [phone]

## 2018-11-22 DIAGNOSIS — Z79.82 LONG TERM (CURRENT) USE OF ASPIRIN: ICD-10-CM

## 2018-11-22 DIAGNOSIS — R73.03 PREDIABETES: ICD-10-CM

## 2018-11-22 DIAGNOSIS — I10 ESSENTIAL (PRIMARY) HYPERTENSION: ICD-10-CM

## 2018-11-22 DIAGNOSIS — Z95.1 PRESENCE OF AORTOCORONARY BYPASS GRAFT: ICD-10-CM

## 2018-11-22 DIAGNOSIS — I25.119 ATHEROSCLEROTIC HEART DISEASE OF NATIVE CORONARY ARTERY WITH UNSPECIFIED ANGINA PECTORIS: ICD-10-CM

## 2018-11-22 DIAGNOSIS — E21.3 HYPERPARATHYROIDISM, UNSPECIFIED: ICD-10-CM

## 2018-11-22 DIAGNOSIS — E78.5 HYPERLIPIDEMIA, UNSPECIFIED: ICD-10-CM

## 2018-11-22 DIAGNOSIS — Z87.891 PERSONAL HISTORY OF NICOTINE DEPENDENCE: ICD-10-CM

## 2019-03-22 PROBLEM — Z00.6 RESEARCH STUDY PATIENT: Status: ACTIVE | Noted: 2019-03-22

## 2019-03-26 ENCOUNTER — APPOINTMENT (OUTPATIENT)
Dept: CARDIOTHORACIC SURGERY | Facility: CLINIC | Age: 54
End: 2019-03-26

## 2019-03-26 DIAGNOSIS — Z00.6 ENCOUNTER FOR EXAMINATION FOR NORMAL COMPARISON AND CONTROL IN CLINICAL RESEARCH PROGRAM: ICD-10-CM

## 2019-07-10 PROCEDURE — 87633 RESP VIRUS 12-25 TARGETS: CPT

## 2019-07-10 PROCEDURE — 80053 COMPREHEN METABOLIC PANEL: CPT

## 2019-07-10 PROCEDURE — 86140 C-REACTIVE PROTEIN: CPT

## 2019-07-10 PROCEDURE — 84484 ASSAY OF TROPONIN QUANT: CPT

## 2019-07-10 PROCEDURE — 85027 COMPLETE CBC AUTOMATED: CPT

## 2019-07-10 PROCEDURE — 80048 BASIC METABOLIC PNL TOTAL CA: CPT

## 2019-07-10 PROCEDURE — 82553 CREATINE MB FRACTION: CPT

## 2019-07-10 PROCEDURE — 93005 ELECTROCARDIOGRAM TRACING: CPT

## 2019-07-10 PROCEDURE — 71046 X-RAY EXAM CHEST 2 VIEWS: CPT

## 2019-07-10 PROCEDURE — 99285 EMERGENCY DEPT VISIT HI MDM: CPT | Mod: 25

## 2019-07-10 PROCEDURE — 87581 M.PNEUMON DNA AMP PROBE: CPT

## 2019-07-10 PROCEDURE — 36415 COLL VENOUS BLD VENIPUNCTURE: CPT

## 2019-07-10 PROCEDURE — G0378: CPT

## 2019-07-10 PROCEDURE — 85025 COMPLETE CBC W/AUTO DIFF WBC: CPT

## 2019-07-10 PROCEDURE — 82550 ASSAY OF CK (CPK): CPT

## 2019-07-10 PROCEDURE — 87798 DETECT AGENT NOS DNA AMP: CPT

## 2019-07-10 PROCEDURE — 83735 ASSAY OF MAGNESIUM: CPT

## 2019-07-10 PROCEDURE — 85652 RBC SED RATE AUTOMATED: CPT

## 2019-07-10 PROCEDURE — 87486 CHLMYD PNEUM DNA AMP PROBE: CPT

## 2019-09-10 PROBLEM — I25.10 ATHEROSCLEROTIC HEART DISEASE OF NATIVE CORONARY ARTERY WITHOUT ANGINA PECTORIS: Chronic | Status: ACTIVE | Noted: 2018-11-17

## 2019-09-11 ENCOUNTER — APPOINTMENT (OUTPATIENT)
Dept: CARDIOTHORACIC SURGERY | Facility: CLINIC | Age: 54
End: 2019-09-11

## 2019-09-16 ENCOUNTER — APPOINTMENT (OUTPATIENT)
Dept: CARDIOTHORACIC SURGERY | Facility: CLINIC | Age: 54
End: 2019-09-16
Payer: MEDICAID

## 2019-09-16 VITALS
TEMPERATURE: 97.1 F | WEIGHT: 160 LBS | SYSTOLIC BLOOD PRESSURE: 146 MMHG | DIASTOLIC BLOOD PRESSURE: 93 MMHG | HEIGHT: 61 IN | RESPIRATION RATE: 18 BRPM | OXYGEN SATURATION: 99 % | HEART RATE: 73 BPM | BODY MASS INDEX: 30.21 KG/M2

## 2019-09-16 DIAGNOSIS — Z95.1 PRESENCE OF AORTOCORONARY BYPASS GRAFT: ICD-10-CM

## 2019-09-16 PROCEDURE — 99213 OFFICE O/P EST LOW 20 MIN: CPT

## 2019-09-16 RX ORDER — OXYCODONE 10 MG/1
10 TABLET ORAL EVERY 6 HOURS
Qty: 60 | Refills: 0 | Status: DISCONTINUED | COMMUNITY
End: 2019-09-16

## 2019-09-16 RX ORDER — IBUPROFEN 800 MG/1
800 TABLET, FILM COATED ORAL
Refills: 0 | Status: DISCONTINUED | COMMUNITY
End: 2019-09-16

## 2019-09-16 NOTE — ASSESSMENT
[FreeTextEntry1] : 54 year old male status post OPCAB x 3 on 9/10/18 presents for a follow up visit. Pt reports persistent numbness to left arm and LLE since surgery. He also reports something sticking out of his LLE EVH site that started 4 months ago. \par \par -surgical clip removed from LLE EVH by Dr. Sanchez.\par -pt provided instructions on cleaning wound with chlorhexidine and applying dry gauze.\par -follow up with vascular sx or neurologist in regards to arm numbness.\par -continue current medication regimen.\par -follow up with cardiologist \par -pt will receive phone call for ARY Trial questions by research team.

## 2019-09-16 NOTE — HISTORY OF PRESENT ILLNESS
[FreeTextEntry1] : 54 year old male status post OPCAB x 3 on 9/10/18 presents for a follow up visit. Pt reports persistent numbness to left arm and LLE since surgery. He also reports something sticking out of his LLE EVH site that started 4 months ago. NYHA Class I. \par \par Otherwise, he is recuperating well from surgery. He denies recent hospitalization, ER visits, or surgeries. He  denies fever, chills, fatigue, headache, blurred vision, dizziness, syncope, chest pain, palpitations, shortness of breath, orthopnea, paroxysmal nocturnal dyspnea, nausea, vomiting, abdominal pain, back pain, BRBPR or swelling to legs. He was evaluated by vascular for LUE numbness and pulse difference in b/l arms. \par BP (L) 132/93, BP (R) 133/90\par

## 2019-09-16 NOTE — PHYSICAL EXAM
[Sclera] : the sclera and conjunctiva were normal [PERRL With Normal Accommodation] : pupils were equal in size, round, and reactive to light [Extraocular Movements] : extraocular movements were intact [Neck Appearance] : the appearance of the neck was normal [Neck Cervical Mass (___cm)] : no neck mass was observed [Jugular Venous Distention Increased] : there was no jugular-venous distention [Thyroid Nodule] : there were no palpable thyroid nodules [Thyroid Diffuse Enlargement] : the thyroid was not enlarged [Auscultation Breath Sounds / Voice Sounds] : lungs were clear to auscultation bilaterally [Heart Rate And Rhythm] : heart rate was normal and rhythm regular [Heart Sounds Gallop] : no gallops [Heart Sounds] : normal S1 and S2 [Murmurs] : no murmurs [Heart Sounds Pericardial Friction Rub] : no pericardial rub [Examination Of The Chest] : the chest was normal in appearance [Chest Visual Inspection Thoracic Asymmetry] : no chest asymmetry [Diminished Respiratory Excursion] : normal chest expansion [2+] : right 2+ [0] : left 0 [No Abnormalities] : the abdominal aorta was not enlarged and no bruit was heard [Bowel Sounds] : normal bowel sounds [Abdomen Soft] : soft [Abdomen Tenderness] : non-tender [Abdomen Mass (___ Cm)] : no abdominal mass palpated [Cervical Lymph Nodes Enlarged Posterior Bilaterally] : posterior cervical [Cervical Lymph Nodes Enlarged Anterior Bilaterally] : anterior cervical [No CVA Tenderness] : no ~M costovertebral angle tenderness [No Spinal Tenderness] : no spinal tenderness [Abnormal Walk] : normal gait [Musculoskeletal - Swelling] : no joint swelling seen [Nail Clubbing] : no clubbing  or cyanosis of the fingernails [Motor Tone] : muscle strength and tone were normal [Skin Color & Pigmentation] : normal skin color and pigmentation [Skin Turgor] : normal skin turgor [] : no rash [Deep Tendon Reflexes (DTR)] : deep tendon reflexes were 2+ and symmetric [Sensation] : the sensory exam was normal to light touch and pinprick [No Focal Deficits] : no focal deficits [Oriented To Time, Place, And Person] : oriented to person, place, and time [Impaired Insight] : insight and judgment were intact [Affect] : the affect was normal [FreeTextEntry1] : deferred

## 2019-11-12 NOTE — PROVIDER CONTACT NOTE (CRITICAL VALUE NOTIFICATION) - RECOMMENDATIONS
Will report to provider Erythromycin Counseling:  I discussed with the patient the risks of erythromycin including but not limited to GI upset, allergic reaction, drug rash, diarrhea, increase in liver enzymes, and yeast infections.

## 2020-06-03 NOTE — H&P ADULT - BACK
[address] [address]  eMERGENCY dEPARTMENT eNCOUnter   Physician Attestation    Pt Name: Margoth Rand  MRN: [id number]  Wiltongfraghavendra 1934  Date of evaluation: 6/3/20        Physician Note:    I have personally performed and/or participated in the history, exam and medical decision making and agree with all pertinent clinical information. I have also reviewed and agree with the past medical, family and social history unless otherwise noted. I have personally performed a face to face diagnostic evaluation on this patient. I have reviewed the mid-levels findings and agree. History: This patient is seen in conjunction with Tera JANE. This is an 80-year-old female, nursing home resident with end-stage renal disease. Currently on peritoneal dialysis and has had episodes of decreasing blood pressure and lightheadedness. Does seem to respond to fluids. We were checking her for signs of infection. We ran the peritoneal fluid. We checked a cell count and Gram stain with no obvious infection, but yet her lactic is elevated. Her troponin is noted to be  elevated but no evidence of ST elevation on her cardiogram.  My PA discussed the case with Dr. Judith Barger and we are bringing her in for observation.     See [facility] note for further details in this patient's care                    Raul Le[address]anc [address] DO  06/03/20 [id number] not examined

## 2021-03-22 ENCOUNTER — NON-APPOINTMENT (OUTPATIENT)
Age: 56
End: 2021-03-22

## 2021-03-24 NOTE — PROVIDER CONTACT NOTE (MEDICATION) - NAME OF MD/NP/PA/DO NOTIFIED:
MAYUR Weston
[FreeTextEntry1] : To continue observation and patient to carry on activities as tolerated. Return in 6 months for recheck.

## 2021-08-10 ENCOUNTER — NON-APPOINTMENT (OUTPATIENT)
Age: 56
End: 2021-08-10

## 2021-08-18 ENCOUNTER — INPATIENT (INPATIENT)
Facility: HOSPITAL | Age: 56
LOS: 0 days | Discharge: HOME | DRG: 247 | End: 2021-08-19
Attending: INTERNAL MEDICINE | Admitting: INTERNAL MEDICINE
Payer: COMMERCIAL

## 2021-08-18 VITALS
HEART RATE: 75 BPM | WEIGHT: 145.06 LBS | OXYGEN SATURATION: 98 % | RESPIRATION RATE: 18 BRPM | SYSTOLIC BLOOD PRESSURE: 129 MMHG | DIASTOLIC BLOOD PRESSURE: 88 MMHG | HEIGHT: 62 IN

## 2021-08-18 DIAGNOSIS — E78.5 HYPERLIPIDEMIA, UNSPECIFIED: ICD-10-CM

## 2021-08-18 DIAGNOSIS — Z95.1 PRESENCE OF AORTOCORONARY BYPASS GRAFT: Chronic | ICD-10-CM

## 2021-08-18 DIAGNOSIS — I73.9 PERIPHERAL VASCULAR DISEASE, UNSPECIFIED: ICD-10-CM

## 2021-08-18 DIAGNOSIS — I21.4 NON-ST ELEVATION (NSTEMI) MYOCARDIAL INFARCTION: ICD-10-CM

## 2021-08-18 DIAGNOSIS — I10 ESSENTIAL (PRIMARY) HYPERTENSION: ICD-10-CM

## 2021-08-18 DIAGNOSIS — E83.52 HYPERCALCEMIA: ICD-10-CM

## 2021-08-18 DIAGNOSIS — I65.29 OCCLUSION AND STENOSIS OF UNSPECIFIED CAROTID ARTERY: ICD-10-CM

## 2021-08-18 DIAGNOSIS — E11.9 TYPE 2 DIABETES MELLITUS WITHOUT COMPLICATIONS: ICD-10-CM

## 2021-08-18 DIAGNOSIS — Z98.1 ARTHRODESIS STATUS: Chronic | ICD-10-CM

## 2021-08-18 DIAGNOSIS — M54.9 DORSALGIA, UNSPECIFIED: ICD-10-CM

## 2021-08-18 DIAGNOSIS — Z29.9 ENCOUNTER FOR PROPHYLACTIC MEASURES, UNSPECIFIED: ICD-10-CM

## 2021-08-18 LAB
ALBUMIN SERPL ELPH-MCNC: 4.4 G/DL — SIGNIFICANT CHANGE UP (ref 3.3–5)
ALP SERPL-CCNC: 153 U/L — HIGH (ref 40–120)
ALT FLD-CCNC: 27 U/L — SIGNIFICANT CHANGE UP (ref 10–45)
ANION GAP SERPL CALC-SCNC: 10 MMOL/L — SIGNIFICANT CHANGE UP (ref 5–17)
APTT BLD: 27.9 SEC — SIGNIFICANT CHANGE UP (ref 27.5–35.5)
AST SERPL-CCNC: 24 U/L — SIGNIFICANT CHANGE UP (ref 10–40)
BASOPHILS # BLD AUTO: 0.02 K/UL — SIGNIFICANT CHANGE UP (ref 0–0.2)
BASOPHILS NFR BLD AUTO: 0.2 % — SIGNIFICANT CHANGE UP (ref 0–2)
BILIRUB SERPL-MCNC: 0.7 MG/DL — SIGNIFICANT CHANGE UP (ref 0.2–1.2)
BLD GP AB SCN SERPL QL: NEGATIVE — SIGNIFICANT CHANGE UP
BUN SERPL-MCNC: 15 MG/DL — SIGNIFICANT CHANGE UP (ref 7–23)
CALCIUM SERPL-MCNC: 13.4 MG/DL — CRITICAL HIGH (ref 8.4–10.5)
CHLORIDE SERPL-SCNC: 103 MMOL/L — SIGNIFICANT CHANGE UP (ref 96–108)
CK MB CFR SERPL CALC: 2.6 NG/ML — SIGNIFICANT CHANGE UP (ref 0–6.7)
CK SERPL-CCNC: 84 U/L — SIGNIFICANT CHANGE UP (ref 30–200)
CO2 SERPL-SCNC: 27 MMOL/L — SIGNIFICANT CHANGE UP (ref 22–31)
CREAT SERPL-MCNC: 0.98 MG/DL — SIGNIFICANT CHANGE UP (ref 0.5–1.3)
EOSINOPHIL # BLD AUTO: 0.09 K/UL — SIGNIFICANT CHANGE UP (ref 0–0.5)
EOSINOPHIL NFR BLD AUTO: 1 % — SIGNIFICANT CHANGE UP (ref 0–6)
GLUCOSE BLDC GLUCOMTR-MCNC: 123 MG/DL — HIGH (ref 70–99)
GLUCOSE BLDC GLUCOMTR-MCNC: 123 MG/DL — HIGH (ref 70–99)
GLUCOSE BLDC GLUCOMTR-MCNC: 138 MG/DL — HIGH (ref 70–99)
GLUCOSE SERPL-MCNC: 125 MG/DL — HIGH (ref 70–99)
HCT VFR BLD CALC: 44.3 % — SIGNIFICANT CHANGE UP (ref 39–50)
HGB BLD-MCNC: 14.4 G/DL — SIGNIFICANT CHANGE UP (ref 13–17)
IMM GRANULOCYTES NFR BLD AUTO: 0.2 % — SIGNIFICANT CHANGE UP (ref 0–1.5)
INR BLD: 1.08 — SIGNIFICANT CHANGE UP (ref 0.88–1.16)
LYMPHOCYTES # BLD AUTO: 2.38 K/UL — SIGNIFICANT CHANGE UP (ref 1–3.3)
LYMPHOCYTES # BLD AUTO: 25.7 % — SIGNIFICANT CHANGE UP (ref 13–44)
MAGNESIUM SERPL-MCNC: 2.1 MG/DL — SIGNIFICANT CHANGE UP (ref 1.6–2.6)
MCHC RBC-ENTMCNC: 27.4 PG — SIGNIFICANT CHANGE UP (ref 27–34)
MCHC RBC-ENTMCNC: 32.5 GM/DL — SIGNIFICANT CHANGE UP (ref 32–36)
MCV RBC AUTO: 84.2 FL — SIGNIFICANT CHANGE UP (ref 80–100)
MONOCYTES # BLD AUTO: 0.83 K/UL — SIGNIFICANT CHANGE UP (ref 0–0.9)
MONOCYTES NFR BLD AUTO: 9 % — SIGNIFICANT CHANGE UP (ref 2–14)
NEUTROPHILS # BLD AUTO: 5.92 K/UL — SIGNIFICANT CHANGE UP (ref 1.8–7.4)
NEUTROPHILS NFR BLD AUTO: 63.9 % — SIGNIFICANT CHANGE UP (ref 43–77)
NRBC # BLD: 0 /100 WBCS — SIGNIFICANT CHANGE UP (ref 0–0)
NT-PROBNP SERPL-SCNC: 1396 PG/ML — HIGH (ref 0–300)
PLATELET # BLD AUTO: 329 K/UL — SIGNIFICANT CHANGE UP (ref 150–400)
POTASSIUM SERPL-MCNC: 4.7 MMOL/L — SIGNIFICANT CHANGE UP (ref 3.5–5.3)
POTASSIUM SERPL-SCNC: 4.7 MMOL/L — SIGNIFICANT CHANGE UP (ref 3.5–5.3)
PROT SERPL-MCNC: 7.6 G/DL — SIGNIFICANT CHANGE UP (ref 6–8.3)
PROTHROM AB SERPL-ACNC: 12.9 SEC — SIGNIFICANT CHANGE UP (ref 10.6–13.6)
RBC # BLD: 5.26 M/UL — SIGNIFICANT CHANGE UP (ref 4.2–5.8)
RBC # FLD: 12.6 % — SIGNIFICANT CHANGE UP (ref 10.3–14.5)
RH IG SCN BLD-IMP: POSITIVE — SIGNIFICANT CHANGE UP
SARS-COV-2 RNA SPEC QL NAA+PROBE: SIGNIFICANT CHANGE UP
SODIUM SERPL-SCNC: 140 MMOL/L — SIGNIFICANT CHANGE UP (ref 135–145)
TROPONIN T SERPL-MCNC: 0.77 NG/ML — CRITICAL HIGH (ref 0–0.01)
WBC # BLD: 9.26 K/UL — SIGNIFICANT CHANGE UP (ref 3.8–10.5)
WBC # FLD AUTO: 9.26 K/UL — SIGNIFICANT CHANGE UP (ref 3.8–10.5)

## 2021-08-18 PROCEDURE — 92941 PRQ TRLML REVSC TOT OCCL AMI: CPT | Mod: LD

## 2021-08-18 PROCEDURE — 92978 ENDOLUMINL IVUS OCT C 1ST: CPT | Mod: 26,LD

## 2021-08-18 PROCEDURE — 93458 L HRT ARTERY/VENTRICLE ANGIO: CPT | Mod: 26,59

## 2021-08-18 PROCEDURE — 71045 X-RAY EXAM CHEST 1 VIEW: CPT | Mod: 26

## 2021-08-18 PROCEDURE — 99291 CRITICAL CARE FIRST HOUR: CPT

## 2021-08-18 PROCEDURE — 99152 MOD SED SAME PHYS/QHP 5/>YRS: CPT

## 2021-08-18 PROCEDURE — 93010 ELECTROCARDIOGRAM REPORT: CPT

## 2021-08-18 RX ORDER — SODIUM CHLORIDE 9 MG/ML
1000 INJECTION, SOLUTION INTRAVENOUS
Refills: 0 | Status: DISCONTINUED | OUTPATIENT
Start: 2021-08-18 | End: 2021-08-19

## 2021-08-18 RX ORDER — LOSARTAN POTASSIUM 100 MG/1
1 TABLET, FILM COATED ORAL
Qty: 0 | Refills: 3 | DISCHARGE

## 2021-08-18 RX ORDER — FENOFIBRATE,MICRONIZED 130 MG
145 CAPSULE ORAL DAILY
Refills: 0 | Status: DISCONTINUED | OUTPATIENT
Start: 2021-08-18 | End: 2021-08-19

## 2021-08-18 RX ORDER — FENOFIBRATE,MICRONIZED 130 MG
1 CAPSULE ORAL
Qty: 0 | Refills: 0 | DISCHARGE

## 2021-08-18 RX ORDER — METOPROLOL TARTRATE 50 MG
1 TABLET ORAL
Qty: 0 | Refills: 0 | DISCHARGE

## 2021-08-18 RX ORDER — SODIUM CHLORIDE 9 MG/ML
500 INJECTION INTRAMUSCULAR; INTRAVENOUS; SUBCUTANEOUS
Refills: 0 | Status: DISCONTINUED | OUTPATIENT
Start: 2021-08-18 | End: 2021-08-19

## 2021-08-18 RX ORDER — AMLODIPINE BESYLATE 2.5 MG/1
10 TABLET ORAL DAILY
Refills: 0 | Status: DISCONTINUED | OUTPATIENT
Start: 2021-08-19 | End: 2021-08-19

## 2021-08-18 RX ORDER — GABAPENTIN 400 MG/1
1 CAPSULE ORAL
Qty: 0 | Refills: 0 | DISCHARGE

## 2021-08-18 RX ORDER — DEXTROSE 50 % IN WATER 50 %
15 SYRINGE (ML) INTRAVENOUS ONCE
Refills: 0 | Status: DISCONTINUED | OUTPATIENT
Start: 2021-08-18 | End: 2021-08-19

## 2021-08-18 RX ORDER — AMLODIPINE BESYLATE 2.5 MG/1
1 TABLET ORAL
Qty: 0 | Refills: 0 | DISCHARGE

## 2021-08-18 RX ORDER — LOSARTAN POTASSIUM 100 MG/1
1 TABLET, FILM COATED ORAL
Qty: 0 | Refills: 0 | DISCHARGE

## 2021-08-18 RX ORDER — ATORVASTATIN CALCIUM 80 MG/1
40 TABLET, FILM COATED ORAL AT BEDTIME
Refills: 0 | Status: DISCONTINUED | OUTPATIENT
Start: 2021-08-18 | End: 2021-08-19

## 2021-08-18 RX ORDER — METOPROLOL TARTRATE 50 MG
50 TABLET ORAL EVERY 12 HOURS
Refills: 0 | Status: DISCONTINUED | OUTPATIENT
Start: 2021-08-18 | End: 2021-08-19

## 2021-08-18 RX ORDER — ASPIRIN/CALCIUM CARB/MAGNESIUM 324 MG
162 TABLET ORAL ONCE
Refills: 0 | Status: COMPLETED | OUTPATIENT
Start: 2021-08-18 | End: 2021-08-18

## 2021-08-18 RX ORDER — DEXTROSE 50 % IN WATER 50 %
25 SYRINGE (ML) INTRAVENOUS ONCE
Refills: 0 | Status: DISCONTINUED | OUTPATIENT
Start: 2021-08-18 | End: 2021-08-19

## 2021-08-18 RX ORDER — DEXTROSE 50 % IN WATER 50 %
12.5 SYRINGE (ML) INTRAVENOUS ONCE
Refills: 0 | Status: DISCONTINUED | OUTPATIENT
Start: 2021-08-18 | End: 2021-08-19

## 2021-08-18 RX ORDER — GABAPENTIN 400 MG/1
300 CAPSULE ORAL THREE TIMES A DAY
Refills: 0 | Status: DISCONTINUED | OUTPATIENT
Start: 2021-08-18 | End: 2021-08-19

## 2021-08-18 RX ORDER — ASPIRIN/CALCIUM CARB/MAGNESIUM 324 MG
1 TABLET ORAL
Qty: 0 | Refills: 0 | DISCHARGE

## 2021-08-18 RX ORDER — CYCLOBENZAPRINE HYDROCHLORIDE 10 MG/1
10 TABLET, FILM COATED ORAL
Refills: 0 | Status: DISCONTINUED | OUTPATIENT
Start: 2021-08-18 | End: 2021-08-19

## 2021-08-18 RX ORDER — TICAGRELOR 90 MG/1
90 TABLET ORAL EVERY 12 HOURS
Refills: 0 | Status: DISCONTINUED | OUTPATIENT
Start: 2021-08-19 | End: 2021-08-19

## 2021-08-18 RX ORDER — OMEPRAZOLE 10 MG/1
1 CAPSULE, DELAYED RELEASE ORAL
Qty: 0 | Refills: 0 | DISCHARGE

## 2021-08-18 RX ORDER — CILOSTAZOL 100 MG/1
100 TABLET ORAL
Refills: 0 | Status: DISCONTINUED | OUTPATIENT
Start: 2021-08-18 | End: 2021-08-19

## 2021-08-18 RX ORDER — ASPIRIN/CALCIUM CARB/MAGNESIUM 324 MG
81 TABLET ORAL DAILY
Refills: 0 | Status: DISCONTINUED | OUTPATIENT
Start: 2021-08-18 | End: 2021-08-19

## 2021-08-18 RX ORDER — CYCLOBENZAPRINE HYDROCHLORIDE 10 MG/1
1 TABLET, FILM COATED ORAL
Qty: 0 | Refills: 0 | DISCHARGE

## 2021-08-18 RX ORDER — INSULIN LISPRO 100/ML
VIAL (ML) SUBCUTANEOUS
Refills: 0 | Status: DISCONTINUED | OUTPATIENT
Start: 2021-08-18 | End: 2021-08-19

## 2021-08-18 RX ORDER — TICAGRELOR 90 MG/1
180 TABLET ORAL ONCE
Refills: 0 | Status: COMPLETED | OUTPATIENT
Start: 2021-08-18 | End: 2021-08-18

## 2021-08-18 RX ORDER — PANTOPRAZOLE SODIUM 20 MG/1
40 TABLET, DELAYED RELEASE ORAL
Refills: 0 | Status: DISCONTINUED | OUTPATIENT
Start: 2021-08-18 | End: 2021-08-19

## 2021-08-18 RX ORDER — NITROGLYCERIN 6.5 MG
0.4 CAPSULE, EXTENDED RELEASE ORAL ONCE
Refills: 0 | Status: COMPLETED | OUTPATIENT
Start: 2021-08-18 | End: 2021-08-18

## 2021-08-18 RX ORDER — METFORMIN HYDROCHLORIDE 850 MG/1
1 TABLET ORAL
Qty: 0 | Refills: 0 | DISCHARGE

## 2021-08-18 RX ORDER — GLUCAGON INJECTION, SOLUTION 0.5 MG/.1ML
1 INJECTION, SOLUTION SUBCUTANEOUS ONCE
Refills: 0 | Status: DISCONTINUED | OUTPATIENT
Start: 2021-08-18 | End: 2021-08-19

## 2021-08-18 RX ADMIN — CILOSTAZOL 100 MILLIGRAM(S): 100 TABLET ORAL at 18:01

## 2021-08-18 RX ADMIN — Medication 162 MILLIGRAM(S): at 13:33

## 2021-08-18 RX ADMIN — SODIUM CHLORIDE 100 MILLILITER(S): 9 INJECTION INTRAMUSCULAR; INTRAVENOUS; SUBCUTANEOUS at 16:06

## 2021-08-18 RX ADMIN — TICAGRELOR 180 MILLIGRAM(S): 90 TABLET ORAL at 13:33

## 2021-08-18 RX ADMIN — Medication 50 MILLIGRAM(S): at 18:01

## 2021-08-18 RX ADMIN — ATORVASTATIN CALCIUM 40 MILLIGRAM(S): 80 TABLET, FILM COATED ORAL at 22:46

## 2021-08-18 RX ADMIN — GABAPENTIN 300 MILLIGRAM(S): 400 CAPSULE ORAL at 22:46

## 2021-08-18 RX ADMIN — Medication 0.4 MILLIGRAM(S): at 13:33

## 2021-08-18 NOTE — H&P ADULT - HISTORY OF PRESENT ILLNESS
55 y/o Male w/ FHx of DM/HTN (mother) and PMHx of HTN, HLD, NIIDM, b/l ICA (by last US 2019), and CAD s/p CABG (2018 @[facility] w/ CTS Dr. Reddy; LIMA-D1, SVG-LPDA, RAD-OM1), presented BIBEMS to [facility] ED referred from his PCP office for CP w/ EKG changes. Pt states he went to his PCP office for a refill of Metformin but endorsed substernal CP described as "burning" w/ radiating to the stomach, w/ associate nausea after walking >1 block for the past 5 days. An EKG was performed in the office revealing EKG changes and 911 was called. EMS gave STEMI notification and pt was given ASA 161mg PO x1 by EMS.  In the ED, VS: HR 75bpm regular, /88, RR 18 non labored, SpO2 98% on RA, afebrile. EKG: NSR @70bpm w/ LAFB, RAD, w/ 1mm upsloping GAYATHRI in V3-V4 and pathological Q waves in lead III, aVF, and V2-V4. Labs significant for Trop T 0.77, Ca 13.4, H&H 14.4/44.3, BUN/Cr 15/0.98, K 4.7, BNP 1396. Pt continued to endorse 7/10 "burning" CP in the ED. Pt subsequently received NTG 0.4mg SL x1 and was loaded w/ an additional ASA 161mg PO x1 and Brilinta 180mg PO x1 in the ED, a STEMI notification was called, and pt was brought from ED to cardiac cath 8/18/21 w/ Dr. Payne which revealed LM (short vessel) mild disease, pLAD 99% thrombotic occlusion s/p IVUS/LOLA x1, mLAD 80% thrombotic occlusion s/p IVUS/LOLA x1, patent LIMA-D1 graft, patent SVG-LPDA graft, patent RAD-OM1 graft, pLCx mild disease, dLCx 99%, RCA (small vessel, non-dominant) 100%; LVEDP 15mmHg, no AS/MR, EF 60% by LVgram; Access: R CFA s/p Perclose. Per Fellow Dr. Koenig pt was then deemed an NSTEMI and was admitted to the PCU for further management and observation of NSTEMI s/p PCI.

## 2021-08-18 NOTE — ED ADULT NURSE NOTE - OBJECTIVE STATEMENT
Pt presents to ED by EMS from outpatient clinic C/O of chest pain and burning x 5 days. Pt cardiac cath alert PTA, Cath lab notified, ED Attending and CARDS Fellow at bedside upon pt arrival. Pt has hx of CABG 2018 with EKG changes at clinic today as per EMS. Pt being prepped for cath lab by ED team in RESUS, EKG performed, on cardiac monitor, meds given as ordered. Pt alert, awake, speaking full sentences, accepting and understanding of plan of care. RN continuing to monitor.

## 2021-08-18 NOTE — H&P ADULT - PROBLEM SELECTOR PLAN 1
Presented to ED referred from PCP for 7/10 "burning" CP on exertion w/ associated nausea x7 days. EKG changes noted in office --> initially called STEMI  -Hx of 3VCABG in 2018 @[facility] w/ Dr. Reddy  -s/p NTG 0.4mg SL x1, ASA 162mg PO x2, and Brilinta 180mg PO x1 by EMS/ED  -s/p cardiac cath 8/18/21 w/ Dr. Payne: LM (short vessel) mild disease, pLAD 99% thrombotic occlusion s/p IVUS/LOLA x1, mLAD 80% thrombotic occlusion s/p IVUS/LOLA x1, patent LIMA-D1 graft, patent SVG-LPDA graft, patent RAD-OM1 graft, pLCx mild disease, dLCx 99%, RCA (small vessel, non-dominant) 100%; LVEDP 15mmHg, no AS/MR, EF 60% by LVgram; Access: R CFA s/p Perclose.  **Incomplete**__________________________________________  -c/w ASA 81mg QD, Brilinta 90mg BID, and statin Presented to ED referred from PCP for 7/10 "burning" CP on exertion w/ associated nausea x7 days. EKG changes noted in office --> initially called STEMI  -Hx of 3VCABG in 2018 @[facility] w/ Dr. Reddy  -s/p NTG 0.4mg SL x1, ASA 162mg PO x2, and Brilinta 180mg PO x1 by EMS/ED  -s/p cardiac cath 8/18/21 w/ Dr. Payne: LM (short vessel) mild disease, pLAD 99% thrombotic occlusion s/p IVUS/LOLA x1, mLAD 80% thrombotic occlusion s/p IVUS/LOLA x1, patent LIMA-D1 graft, patent SVG-LPDA graft, patent RAD-OM1 graft, pLCx mild disease, dLCx 99%, RCA (small vessel, non-dominant) 100%; LVEDP 15mmHg, no AS/MR, EF 60% by LVgram; Access: R CFA s/p Perclose.  **Incomplete**__________________________________________  -c/w ASA 81mg QD, Brilinta 90mg BID, and statin  -Continuous tele/pulse ox, VS per routine Presented to ED referred from PCP for 7/10 "burning" CP on exertion w/ associated nausea x7 days. EKG changes noted in office --> initially called STEMI  -Hx of 3VCABG in 2018 @[facility] w/ Dr. Reddy (anatomy in cath report below)  -EKG: NSR @70bpm w/ LAFB, RAD, w/ 1mm upsloping GAYATHRI in V3-V4 and pathological Q waves in lead III, aVF, and V2-V4.  -Trop T 0.77 on arrival  -s/p NTG 0.4mg SL x1, ASA 162mg PO x2, and Brilinta 180mg PO x1 by EMS/ED  -s/p cardiac cath 8/18/21 w/ Dr. Payne: LM (short vessel) mild disease, pLAD 99% thrombotic occlusion s/p IVUS/LOLA x1, mLAD 80% thrombotic occlusion s/p IVUS/LOLA x1, patent LIMA-D1 graft, patent SVG-LPDA graft, patent RAD-OM1 graft, pLCx mild disease, dLCx 99%, RCA (small vessel, non-dominant) 100%; LVEDP 15mmHg, no AS/MR, EF 60% by LVgram; Access: R CFA s/p Perclose.  -IV NS @100cc/hr x5hrs ordered post-cath  -c/w ASA 81mg QD, Brilinta 90mg BID, and Atorvastatin 40mg QHS  -Continuous tele/pulse ox, VS per routine

## 2021-08-18 NOTE — H&P ADULT - NSICDXPASTMEDICALHX_GEN_ALL_CORE_FT
PAST MEDICAL HISTORY:  CAD (coronary artery disease)     Diabetes     HLD (hyperlipidemia)     HTN (hypertension)

## 2021-08-18 NOTE — H&P ADULT - PROBLEM SELECTOR PLAN 3
Lipid panel ordered for AM, f/u  -c/w Atorvastatin 40mg QHS Lipid panel ordered for AM, f/u  -c/w home Atorvastatin 40mg QHS and home Fenofibrate 160mg QD

## 2021-08-18 NOTE — H&P ADULT - PROBLEM SELECTOR PLAN 2
SBP range this admission: 129-121  -c/w _____________________. SBP range this admission: 129-121  -c/w home Amlodipine 10mg QD and home Metoprolol succinate 50mg BID  -Home Losartan 100mg QD on hold, restart as clinically appropriate

## 2021-08-18 NOTE — H&P ADULT - NSHPLABSRESULTS_GEN_ALL_CORE
14.4   9.26  )-----------( 329      ( 18 Aug 2021 13:15 )             44.3   08-18    140  |  103  |  15  ----------------------------<  125<H>  4.7   |  27  |  0.98    Ca    13.4<HH>      18 Aug 2021 13:15    Mg     2.1     08-18    TPro  7.6  /  Alb  4.4  /  TBili  0.7  /  DBili  x   /  AST  24  /  ALT  27  /  AlkPhos  153<H>  08-18    PT/INR - ( 18 Aug 2021 13:15 )   PT: 12.9 sec;   INR: 1.08     PTT - ( 18 Aug 2021 13:15 )  PTT:27.9 sec    CARDIAC MARKERS ( 18 Aug 2021 13:15 )  x     / 0.77 ng/mL / 84 U/L / x     / 2.6 ng/mL

## 2021-08-18 NOTE — H&P ADULT - NSHPSOCIALHISTORY_GEN_ALL_CORE
Tobacco: Former smoker  ETOH: Denies  Illicit: Denies Tobacco: Former smoker  ETOH: Denies  Illicit: Denies    Pt ambulated w/ cane.

## 2021-08-18 NOTE — H&P ADULT - PROBLEM SELECTOR PROBLEM 1
Anesthetic History   No history of anesthetic complications            Review of Systems / Medical History  Patient summary reviewed, nursing notes reviewed and pertinent labs reviewed    Pulmonary  Within defined limits                 Neuro/Psych   Within defined limits           Cardiovascular  Within defined limits                Exercise tolerance: >4 METS     GI/Hepatic/Renal               Comments: Left Inguinal Hernia Endo/Other      Hypothyroidism       Other Findings   Comments: Chronic neck/back pain         Physical Exam    Airway  Mallampati: I    Neck ROM: normal range of motion   Mouth opening: Normal     Cardiovascular  Regular rate and rhythm,  S1 and S2 normal,  no murmur, click, rub, or gallop             Dental  No notable dental hx       Pulmonary  Breath sounds clear to auscultation               Abdominal  GI exam deferred       Other Findings            Anesthetic Plan    ASA: 2  Anesthesia type: general          Induction: Intravenous  Anesthetic plan and risks discussed with: Patient NSTEMI (non-ST elevation myocardial infarction)

## 2021-08-18 NOTE — H&P ADULT - PROBLEM SELECTOR PLAN 6
VTE ppx: None s/p PCI  Dispo: Home tomorrow  Pt will need outpt Cardiologist referral Reports intermittent LE claudication; ambulates w/ cane at baseline 2/2 weakness/back pain  -b/l DP/PT's 2+ post-cath  -c/w home Cilostazol 100mg BID 30 min prior or 2 hours after breakfast and dinner  -Avoid VCD's

## 2021-08-18 NOTE — ED PROVIDER NOTE - CLINICAL SUMMARY MEDICAL DECISION MAKING FREE TEXT BOX
intermittent chest pain x 5 days, increased with exertion. ecg with changes concerning for anterior stemi. given asa 162 by ems. cards fellow neville in ed. decision made to activate cath lab. brilinta 180mg and additional 162 asa given along with sl nitro x1. hemodynamically stable. admit for further management

## 2021-08-18 NOTE — H&P ADULT - PROBLEM SELECTOR PLAN 9
VTE ppx: None s/p PCI  GI ppx: c/w Pantoprazole 40mg QD (IC for home Omeprazole 40mg QD)  Dispo: Home tomorrow  Pt ambulated w/ cane at baseline but reports he is stable on his feet, no PT consult at this time  Pt will need outpt Cardiologist referral

## 2021-08-18 NOTE — H&P ADULT - NSICDXPASTSURGICALHX_GEN_ALL_CORE_FT
PAST SURGICAL HISTORY:  History of lumbar spinal fusion     S/P CABG (coronary artery bypass graft) @[facility] in 2018 w/ CTS Dr. Reddy

## 2021-08-18 NOTE — ED PROVIDER NOTE - NSICDXPASTMEDICALHX_GEN_ALL_CORE_FT
PAST MEDICAL HISTORY:  CAD (coronary artery disease)     HLD (hyperlipidemia)     HTN (hypertension)     Pre-diabetes

## 2021-08-18 NOTE — PATIENT PROFILE ADULT - FOOD INSECURITY
Consult Physiatry/ Pain management  Reported change in Right hand functions and ability to  eating utensils  Nursing to offer PRN pain management as needed    Continue trial of hand splint  Will consider CT scan if unresolved with intervention
Strong hx of recurrent falls   Potentially may have injured Right hand during past fall  Fall precaution  Continue 24/7 LTCF supportive care and management  Continue PT/OT/ST as needed 
no

## 2021-08-18 NOTE — ED ADULT TRIAGE NOTE - OTHER COMPLAINTS
patient BIBEMS as STEMI alert c/o CP x 1 week relieved with antacids-- hx of open heart surgery x 3 years ago-- received 162 mg Aspirin in field PTA

## 2021-08-18 NOTE — H&P ADULT - PROBLEM SELECTOR PLAN 4
HbA1c ordered for AM, f/u  -MISS ordered  -Home Metformin on hold as inpatient (last took 8/15/21 2/2 pt running out)  -FS BGL per routine HbA1c ordered for AM, f/u  -MISS ordered  -Home Metformin 500mg BID on hold as inpatient (last took 8/15/21 2/2 pt running out)  -FS BGL per routine

## 2021-08-18 NOTE — ED PROVIDER NOTE - CARDIAC, MLM
Normal rate, regular rhythm.  Heart sounds S1, S2.  No murmurs, rubs or gallops. well healed scar on chest

## 2021-08-18 NOTE — H&P ADULT - PROBLEM SELECTOR PLAN 7
b/l ICA stenosis per outpt MD note provided on arrival  -last US 2019, stable per MD note; NTD Ca 13.4 on arrival  -No fatigue, bone pain, headaches, neuro, MSK or GI symptoms  -IV NS @100cc/hr x5hrs ordered post-cath  -Continue to monitor, consider endo f/u and/or adding bisphosphonate on discharge Ca 13.4 on arrival  -No fatigue, bone pain, headaches, neuro, MSK or GI symptoms  -IV NS @100cc/hr x5hrs ordered post-cath  -Trend BMP  -Continue to monitor, consider endo f/u and/or adding bisphosphonate on discharge

## 2021-08-18 NOTE — H&P ADULT - MUSCULOSKELETAL
The patient was called to complete the blood work prior the visit on 3/20/2020  phone number not in service  not examined

## 2021-08-18 NOTE — H&P ADULT - ASSESSMENT
55 y/o Male w/ FHx of DM/HTN (mother) and PMHx of HTN, HLD, NIIDM, b/l ICA (by last US 2019), and CAD s/p CABG (2018 @[facility] w/ CTS Dr. Reddy; LIMA-D1, SVG-LPDA, RAD-OM1), presented BIBEMS to [facility] ED referred from his PCP office for CP w/ EKG changes. Pt states he went to his PCP office for a refill of Metformin but endorsed substernal CP described as "burning" w/ radiating to the stomach, w/ associate nausea after walking >1 block for the past 5 days. An EKG was performed in the office revealing EKG changes and 911 was called. Now s/p cardiac cath 8/18/21: LM (short vessel) mild disease, pLAD 99% thrombotic occlusion s/p IVUS/LOLA x1, mLAD 80% thrombotic occlusion s/p IVUS/LOLA x1, patent LIMA-D1 graft, patent SVG-LPDA graft, patent RAD-OM1 graft, pLCx mild disease, dLCx 99%, RCA (small vessel, non-dominant) 100%; LVEDP 15mmHg, no AS/MR, EF 60% by LVgram. Per Fellow Dr. Koenig pt was then deemed an NSTEMI and was admitted to the PCU for further management and observation of NSTEMI s/p PCI.

## 2021-08-18 NOTE — ED PROVIDER NOTE - NSICDXPASTSURGICALHX_GEN_ALL_CORE_FT
PAST SURGICAL HISTORY:  History of lumbar spinal fusion     S/P CABG (coronary artery bypass graft)

## 2021-08-18 NOTE — H&P ADULT - PROBLEM SELECTOR PLAN 8
VTE ppx: None s/p PCI  GI ppx: c/w Pantoprazole 40mg QD (IC for home Omeprazole 40mg QD)  Dispo: Home tomorrow  Pt ambulated w/ cane at baseline but reports he is stable on his feet, no PT consult at this time  Pt will need outpt Cardiologist referral b/l ICA stenosis per outpt MD note provided on arrival  -last US 2019, stable per MD note; NTD

## 2021-08-18 NOTE — ED PROVIDER NOTE - OBJECTIVE STATEMENT
history of htn, cad s/p cabg, here with intermittent burning pain in chest for past 5 days. Last felt this morning around 9am, lasted about an hour. Burning pain upper abdomen to chest, more in middle/ left. Takes antacid and improves but also notes more with walking. Denies sob, fever, chills, cough, n/v. Given aspirin 162 mg by ems. Went to clinic today for eval and sent to ed after ecg changes noted. Currently pain free

## 2021-08-18 NOTE — H&P ADULT - PROBLEM SELECTOR PLAN 5
b/l ICA stenosis per outpt MD note provided on arrival  -last US 2019, stable per MD note; NTD s/p back surgery for injury sustained from fall in 2008  -c/w home Gabapentin 300mg q8hrs and home cyclobenzaprine 10mg q12hrs PRN

## 2021-08-19 ENCOUNTER — TRANSCRIPTION ENCOUNTER (OUTPATIENT)
Age: 56
End: 2021-08-19

## 2021-08-19 VITALS
DIASTOLIC BLOOD PRESSURE: 74 MMHG | OXYGEN SATURATION: 95 % | SYSTOLIC BLOOD PRESSURE: 117 MMHG | HEART RATE: 95 BPM | RESPIRATION RATE: 18 BRPM

## 2021-08-19 LAB
A1C WITH ESTIMATED AVERAGE GLUCOSE RESULT: 7.2 % — HIGH (ref 4–5.6)
ANION GAP SERPL CALC-SCNC: 10 MMOL/L — SIGNIFICANT CHANGE UP (ref 5–17)
BASOPHILS # BLD AUTO: 0.02 K/UL — SIGNIFICANT CHANGE UP (ref 0–0.2)
BASOPHILS NFR BLD AUTO: 0.2 % — SIGNIFICANT CHANGE UP (ref 0–2)
BUN SERPL-MCNC: 12 MG/DL — SIGNIFICANT CHANGE UP (ref 7–23)
CALCIUM SERPL-MCNC: 12.5 MG/DL — HIGH (ref 8.4–10.5)
CHLORIDE SERPL-SCNC: 103 MMOL/L — SIGNIFICANT CHANGE UP (ref 96–108)
CHOLEST SERPL-MCNC: 136 MG/DL — SIGNIFICANT CHANGE UP
CO2 SERPL-SCNC: 23 MMOL/L — SIGNIFICANT CHANGE UP (ref 22–31)
COVID-19 SPIKE DOMAIN AB INTERP: POSITIVE
COVID-19 SPIKE DOMAIN ANTIBODY RESULT: >250 U/ML — HIGH
CREAT SERPL-MCNC: 0.96 MG/DL — SIGNIFICANT CHANGE UP (ref 0.5–1.3)
EOSINOPHIL # BLD AUTO: 0.04 K/UL — SIGNIFICANT CHANGE UP (ref 0–0.5)
EOSINOPHIL NFR BLD AUTO: 0.5 % — SIGNIFICANT CHANGE UP (ref 0–6)
ESTIMATED AVERAGE GLUCOSE: 160 MG/DL — HIGH (ref 68–114)
GLUCOSE BLDC GLUCOMTR-MCNC: 124 MG/DL — HIGH (ref 70–99)
GLUCOSE BLDC GLUCOMTR-MCNC: 323 MG/DL — HIGH (ref 70–99)
GLUCOSE SERPL-MCNC: 145 MG/DL — HIGH (ref 70–99)
HCT VFR BLD CALC: 41.3 % — SIGNIFICANT CHANGE UP (ref 39–50)
HCV AB S/CO SERPL IA: 0.04 S/CO — SIGNIFICANT CHANGE UP
HCV AB SERPL-IMP: SIGNIFICANT CHANGE UP
HDLC SERPL-MCNC: 37 MG/DL — LOW
HGB BLD-MCNC: 13.7 G/DL — SIGNIFICANT CHANGE UP (ref 13–17)
IMM GRANULOCYTES NFR BLD AUTO: 0.3 % — SIGNIFICANT CHANGE UP (ref 0–1.5)
LIPID PNL WITH DIRECT LDL SERPL: 69 MG/DL — SIGNIFICANT CHANGE UP
LYMPHOCYTES # BLD AUTO: 1.86 K/UL — SIGNIFICANT CHANGE UP (ref 1–3.3)
LYMPHOCYTES # BLD AUTO: 21.6 % — SIGNIFICANT CHANGE UP (ref 13–44)
MAGNESIUM SERPL-MCNC: 2 MG/DL — SIGNIFICANT CHANGE UP (ref 1.6–2.6)
MCHC RBC-ENTMCNC: 27.8 PG — SIGNIFICANT CHANGE UP (ref 27–34)
MCHC RBC-ENTMCNC: 33.2 GM/DL — SIGNIFICANT CHANGE UP (ref 32–36)
MCV RBC AUTO: 83.9 FL — SIGNIFICANT CHANGE UP (ref 80–100)
MONOCYTES # BLD AUTO: 0.82 K/UL — SIGNIFICANT CHANGE UP (ref 0–0.9)
MONOCYTES NFR BLD AUTO: 9.5 % — SIGNIFICANT CHANGE UP (ref 2–14)
NEUTROPHILS # BLD AUTO: 5.84 K/UL — SIGNIFICANT CHANGE UP (ref 1.8–7.4)
NEUTROPHILS NFR BLD AUTO: 67.9 % — SIGNIFICANT CHANGE UP (ref 43–77)
NON HDL CHOLESTEROL: 99 MG/DL — SIGNIFICANT CHANGE UP
NRBC # BLD: 0 /100 WBCS — SIGNIFICANT CHANGE UP (ref 0–0)
PLATELET # BLD AUTO: 319 K/UL — SIGNIFICANT CHANGE UP (ref 150–400)
POTASSIUM SERPL-MCNC: 4.1 MMOL/L — SIGNIFICANT CHANGE UP (ref 3.5–5.3)
POTASSIUM SERPL-SCNC: 4.1 MMOL/L — SIGNIFICANT CHANGE UP (ref 3.5–5.3)
RBC # BLD: 4.92 M/UL — SIGNIFICANT CHANGE UP (ref 4.2–5.8)
RBC # FLD: 12.4 % — SIGNIFICANT CHANGE UP (ref 10.3–14.5)
SARS-COV-2 IGG+IGM SERPL QL IA: >250 U/ML — HIGH
SARS-COV-2 IGG+IGM SERPL QL IA: POSITIVE
SODIUM SERPL-SCNC: 136 MMOL/L — SIGNIFICANT CHANGE UP (ref 135–145)
TRIGL SERPL-MCNC: 152 MG/DL — HIGH
WBC # BLD: 8.61 K/UL — SIGNIFICANT CHANGE UP (ref 3.8–10.5)
WBC # FLD AUTO: 8.61 K/UL — SIGNIFICANT CHANGE UP (ref 3.8–10.5)

## 2021-08-19 PROCEDURE — C1769: CPT

## 2021-08-19 PROCEDURE — 86901 BLOOD TYPING SEROLOGIC RH(D): CPT

## 2021-08-19 PROCEDURE — 85730 THROMBOPLASTIN TIME PARTIAL: CPT

## 2021-08-19 PROCEDURE — 82550 ASSAY OF CK (CPK): CPT

## 2021-08-19 PROCEDURE — 83735 ASSAY OF MAGNESIUM: CPT

## 2021-08-19 PROCEDURE — 36415 COLL VENOUS BLD VENIPUNCTURE: CPT

## 2021-08-19 PROCEDURE — 87635 SARS-COV-2 COVID-19 AMP PRB: CPT

## 2021-08-19 PROCEDURE — C1760: CPT

## 2021-08-19 PROCEDURE — C1887: CPT

## 2021-08-19 PROCEDURE — 85610 PROTHROMBIN TIME: CPT

## 2021-08-19 PROCEDURE — 82553 CREATINE MB FRACTION: CPT

## 2021-08-19 PROCEDURE — 83880 ASSAY OF NATRIURETIC PEPTIDE: CPT

## 2021-08-19 PROCEDURE — 86803 HEPATITIS C AB TEST: CPT

## 2021-08-19 PROCEDURE — C1725: CPT

## 2021-08-19 PROCEDURE — 85025 COMPLETE CBC W/AUTO DIFF WBC: CPT

## 2021-08-19 PROCEDURE — 86850 RBC ANTIBODY SCREEN: CPT

## 2021-08-19 PROCEDURE — 80053 COMPREHEN METABOLIC PANEL: CPT

## 2021-08-19 PROCEDURE — 99239 HOSP IP/OBS DSCHRG MGMT >30: CPT

## 2021-08-19 PROCEDURE — 86769 SARS-COV-2 COVID-19 ANTIBODY: CPT

## 2021-08-19 PROCEDURE — C1894: CPT

## 2021-08-19 PROCEDURE — 80061 LIPID PANEL: CPT

## 2021-08-19 PROCEDURE — 99291 CRITICAL CARE FIRST HOUR: CPT

## 2021-08-19 PROCEDURE — 83036 HEMOGLOBIN GLYCOSYLATED A1C: CPT

## 2021-08-19 PROCEDURE — 84484 ASSAY OF TROPONIN QUANT: CPT

## 2021-08-19 PROCEDURE — C1874: CPT

## 2021-08-19 PROCEDURE — 80048 BASIC METABOLIC PNL TOTAL CA: CPT

## 2021-08-19 PROCEDURE — 82962 GLUCOSE BLOOD TEST: CPT

## 2021-08-19 PROCEDURE — 86900 BLOOD TYPING SEROLOGIC ABO: CPT

## 2021-08-19 PROCEDURE — 71045 X-RAY EXAM CHEST 1 VIEW: CPT

## 2021-08-19 RX ORDER — TICAGRELOR 90 MG/1
1 TABLET ORAL
Qty: 60 | Refills: 0
Start: 2021-08-19 | End: 2021-09-17

## 2021-08-19 RX ORDER — CILOSTAZOL 100 MG/1
1 TABLET ORAL
Qty: 0 | Refills: 0 | DISCHARGE

## 2021-08-19 RX ORDER — PRASUGREL 5 MG/1
60 TABLET, FILM COATED ORAL ONCE
Refills: 0 | Status: COMPLETED | OUTPATIENT
Start: 2021-08-19 | End: 2021-08-19

## 2021-08-19 RX ORDER — PRASUGREL 5 MG/1
1 TABLET, FILM COATED ORAL
Qty: 30 | Refills: 11
Start: 2021-08-19 | End: 2022-08-13

## 2021-08-19 RX ORDER — ASPIRIN/CALCIUM CARB/MAGNESIUM 324 MG
1 TABLET ORAL
Qty: 30 | Refills: 11
Start: 2021-08-19 | End: 2022-08-13

## 2021-08-19 RX ORDER — ASPIRIN/CALCIUM CARB/MAGNESIUM 324 MG
1 TABLET ORAL
Qty: 0 | Refills: 0 | DISCHARGE

## 2021-08-19 RX ORDER — PRASUGREL 5 MG/1
1 TABLET, FILM COATED ORAL
Qty: 30 | Refills: 0
Start: 2021-08-19 | End: 2021-09-17

## 2021-08-19 RX ORDER — TICAGRELOR 90 MG/1
1 TABLET ORAL
Qty: 60 | Refills: 11
Start: 2021-08-19 | End: 2022-08-13

## 2021-08-19 RX ORDER — ATORVASTATIN CALCIUM 80 MG/1
1 TABLET, FILM COATED ORAL
Qty: 0 | Refills: 0 | DISCHARGE

## 2021-08-19 RX ORDER — ATORVASTATIN CALCIUM 80 MG/1
1 TABLET, FILM COATED ORAL
Qty: 30 | Refills: 2
Start: 2021-08-19 | End: 2021-11-16

## 2021-08-19 RX ADMIN — Medication 8: at 11:45

## 2021-08-19 RX ADMIN — GABAPENTIN 300 MILLIGRAM(S): 400 CAPSULE ORAL at 07:03

## 2021-08-19 RX ADMIN — PRASUGREL 60 MILLIGRAM(S): 5 TABLET, FILM COATED ORAL at 11:16

## 2021-08-19 RX ADMIN — AMLODIPINE BESYLATE 10 MILLIGRAM(S): 2.5 TABLET ORAL at 11:16

## 2021-08-19 RX ADMIN — Medication 81 MILLIGRAM(S): at 11:16

## 2021-08-19 RX ADMIN — Medication 50 MILLIGRAM(S): at 07:03

## 2021-08-19 RX ADMIN — PANTOPRAZOLE SODIUM 40 MILLIGRAM(S): 20 TABLET, DELAYED RELEASE ORAL at 07:04

## 2021-08-19 RX ADMIN — TICAGRELOR 90 MILLIGRAM(S): 90 TABLET ORAL at 07:03

## 2021-08-19 RX ADMIN — CILOSTAZOL 100 MILLIGRAM(S): 100 TABLET ORAL at 07:04

## 2021-08-19 NOTE — DISCHARGE NOTE NURSING/CASE MANAGEMENT/SOCIAL WORK - NSDCPEFALRISK_GEN_ALL_CORE
For information on Fall & injury Prevention, visit [email or web address]

## 2021-08-19 NOTE — DISCHARGE NOTE PROVIDER - NSDCMRMEDTOKEN_GEN_ALL_CORE_FT
amLODIPine 10 mg oral tablet: 1 tab(s) orally once a day  Aspirin Enteric Coated 81 mg oral delayed release tablet: 1 tab(s) orally once a day  atorvastatin 40 mg oral tablet: 1 tab(s) orally once a day  cilostazol 100 mg oral tablet: 1 tab(s) orally 2 times a day 30 minutes before or 2 hours after breakfast/dinner  cyclobenzaprine 10 mg oral tablet: 1 tab(s) orally every 12 hours, As Needed  fenofibrate 160 mg oral tablet: 1 tab(s) orally once a day  gabapentin 300 mg oral tablet: 1 tab(s) orally 3 times a day  losartan 100 mg oral tablet: 1 tab(s) orally once a day  metFORMIN 500 mg oral tablet: 1 tab(s) orally 2 times a day  metoprolol succinate 50 mg oral tablet, extended release: 1 tab(s) orally 2 times a day  naproxen 500 mg oral tablet: 1 tab(s) orally 2 times a day, As Needed  omeprazole 40 mg oral delayed release capsule: 1 cap(s) orally once a day   amLODIPine 10 mg oral tablet: 1 tab(s) orally once a day  Aspirin Enteric Coated 81 mg oral delayed release tablet: 1 tab(s) orally once a day  atorvastatin 40 mg oral tablet: 1 tab(s) orally once a day  cilostazol 100 mg oral tablet: 1 tab(s) orally 2 times a day 30 minutes before or 2 hours after breakfast/dinner  cyclobenzaprine 10 mg oral tablet: 1 tab(s) orally every 12 hours, As Needed  fenofibrate 160 mg oral tablet: 1 tab(s) orally once a day  gabapentin 300 mg oral tablet: 1 tab(s) orally 3 times a day  losartan 100 mg oral tablet: 1 tab(s) orally once a day  metFORMIN 500 mg oral tablet: 1 tab(s) orally 2 times a day  metoprolol succinate 50 mg oral tablet, extended release: 1 tab(s) orally 2 times a day  naproxen 500 mg oral tablet: 1 tab(s) orally 2 times a day, As Needed  omeprazole 40 mg oral delayed release capsule: 1 cap(s) orally once a day  ticagrelor 90 mg oral tablet: 1 tab(s) orally every 12 hours   amLODIPine 10 mg oral tablet: 1 tab(s) orally once a day  Aspirin Enteric Coated 81 mg oral delayed release tablet: 1 tab(s) orally once a day  atorvastatin 40 mg oral tablet: 1 tab(s) orally once a day  cilostazol 100 mg oral tablet: 1 tab(s) orally 2 times a day 30 minutes before or 2 hours after breakfast/dinner  cyclobenzaprine 10 mg oral tablet: 1 tab(s) orally every 12 hours, As Needed  fenofibrate 160 mg oral tablet: 1 tab(s) orally once a day  gabapentin 300 mg oral tablet: 1 tab(s) orally 3 times a day  losartan 100 mg oral tablet: 1 tab(s) orally once a day  metFORMIN 500 mg oral tablet: 1 tab(s) orally 2 times a day  metoprolol succinate 50 mg oral tablet, extended release: 1 tab(s) orally 2 times a day  omeprazole 40 mg oral delayed release capsule: 1 cap(s) orally once a day  prasugrel 10 mg oral tablet: 1 tab(s) orally once a day   amLODIPine 10 mg oral tablet: 1 tab(s) orally once a day  Aspirin Enteric Coated 81 mg oral delayed release tablet: 1 tab(s) orally once a day  atorvastatin 80 mg oral tablet: 1 tab(s) orally once a day  cyclobenzaprine 10 mg oral tablet: 1 tab(s) orally every 12 hours, As Needed  fenofibrate 160 mg oral tablet: 1 tab(s) orally once a day  gabapentin 300 mg oral tablet: 1 tab(s) orally 3 times a day  losartan 100 mg oral tablet: 1 tab(s) orally once a day  metFORMIN 500 mg oral tablet: 1 tab(s) orally 2 times a day  metoprolol succinate 50 mg oral tablet, extended release: 1 tab(s) orally 2 times a day  omeprazole 40 mg oral delayed release capsule: 1 cap(s) orally once a day  prasugrel 10 mg oral tablet: 1 tab(s) orally once a day

## 2021-08-19 NOTE — DISCHARGE NOTE PROVIDER - NSDCFUADDINST_GEN_ALL_CORE_FT
-Your procedure was done through your groin    -You do not need to keep this area covered and you may shower   -Please avoid any heavy lifting  (no more than 3 to 5 lbs) or strenuous activity for five days   -If you develop any swelling, bleeding, hardening of the skin (hematoma formation), acute pain, numbness/tingling  in your leg please contact your doctor immediately or call our 24/7 line: [phone]

## 2021-08-19 NOTE — DISCHARGE NOTE PROVIDER - HOSPITAL COURSE
57 y/o Male w/ FHx of DM/HTN (mother) and PMHx of HTN, HLD, NIIDM, b/l ICA (by last US 2019), and CAD s/p CABG (2018 @[facility] w/ CTS Dr. Reddy; LIMA-D1, SVG-LPDA, RAD-OM1), presented BIBEMS to [facility] ED referred from his PCP office for CP w/ EKG changes. Pt states he went to his PCP office for a refill of Metformin but endorsed substernal CP described as "burning" w/ radiating to the stomach, w/ associate nausea after walking >1 block for the past 5 days. An EKG was performed in the office revealing EKG changes and 911 was called. EMS gave STEMI notification and pt was given ASA 161mg PO x1 by EMS.  In the ED, VS: HR 75bpm regular, /88, RR 18 non labored, SpO2 98% on RA, afebrile. EKG: NSR @70bpm w/ LAFB, RAD, w/ 1mm upsloping GAYATHRI in V3-V4 and pathological Q waves in lead III, aVF, and V2-V4. Labs significant for Trop T 0.77, Ca 13.4, H&H 14.4/44.3, BUN/Cr 15/0.98, K 4.7, BNP 1396. Pt continued to endorse 7/10 "burning" CP in the ED. Pt subsequently received NTG 0.4mg SL x1 and was loaded w/ an additional ASA 161mg PO x1 and Brilinta 180mg PO x1 in the ED, a STEMI notification was called, and pt was brought from ED to cardiac cath 8/18/21 w/ Dr. Payne which revealed LM (short vessel) mild disease, pLAD 99% thrombotic occlusion s/p IVUS/LOLA x1, mLAD 80% thrombotic occlusion s/p IVUS/LOLA x1, patent LIMA-D1 graft, patent SVG-LPDA graft, patent RAD-OM1 graft, pLCx mild disease, dLCx 99%, RCA (small vessel, non-dominant) 100%; LVEDP 15mmHg, no AS/MR, EF 60% by LVgram; Access: R CFA s/p Perclose. Per Fellow Dr. Koenig pt was then deemed an NSTEMI and was admitted to the PCU for further management and observation of NSTEMI s/p PCI.    Patient has been seen and examined at bedside this morning. Patient is out of bed ambulating with no complaints. Right groin access stable with no hematoma, no bleed, 2+ radial pulse. Lab values, telemetry and vital signs have been reviewed and remain stable. Discharge medication regimen has been reviewed with patient and Dr. Arzola; patient will continue to take Aspirin 81 mg daily, Effient 10 mg daily (Brilinta not covered under insurance, Effient copayment confirmed), Lipitor 80 mg daily, Fenofibrate 160 mg daily, Losartan 100 mg daily, Toprol XL 50 mg daily, Norvasc 10 mg daily, Protonix 40 mg daily. Patient has been cleared for discharge at this time per Dr. Arzola and patient will follow up with Dr. Payne at [address] [place] [address] 8/23/2021 @ 2:30 PM for a post PCI check up. All discharge instructions have been reviewed with patient and medications have been e-prescribed to patient’s preferred pharmacy. Referral and prescription given for cardiac rehab. Patient given a list of locations and instructed to contact their insurance company to review participating providers in their network. Patient instructed to bring prescription with them to their follow up appointment with their cardiologist who can further assist in cardiac rehab enrollment.  Education on benefits of cardiac rehab provided to patient.      57 y/o Male w/ FHx of DM/HTN (mother) and PMHx of HTN, HLD, NIIDM, b/l ICA (by last US 2019), and CAD s/p CABG (2018 @[facility] w/ CTS Dr. Reddy; LIMA-D1, SVG-LPDA, RAD-OM1), presented BIBEMS to [facility] ED referred from his PCP office for CP w/ EKG changes. Pt states he went to his PCP office for a refill of Metformin but endorsed substernal CP described as "burning" w/ radiating to the stomach, w/ associate nausea after walking >1 block for the past 5 days. An EKG was performed in the office revealing EKG changes and 911 was called. EMS gave STEMI notification and pt was given ASA 161mg PO x1 by EMS.  In the ED, VS: HR 75bpm regular, /88, RR 18 non labored, SpO2 98% on RA, afebrile. EKG: NSR @70bpm w/ LAFB, RAD, w/ 1mm upsloping GAYATHRI in V3-V4 and pathological Q waves in lead III, aVF, and V2-V4. Labs significant for Trop T 0.77, Ca 13.4, H&H 14.4/44.3, BUN/Cr 15/0.98, K 4.7, BNP 1396. Pt continued to endorse 7/10 "burning" CP in the ED. Pt subsequently received NTG 0.4mg SL x1 and was loaded w/ an additional ASA 161mg PO x1 and Brilinta 180mg PO x1 in the ED, a STEMI notification was called, and pt was brought from ED to cardiac cath 8/18/21 w/ Dr. Payne which revealed LM (short vessel) mild disease, pLAD 99% thrombotic occlusion s/p IVUS/LOLA x1, mLAD 80% thrombotic occlusion s/p IVUS/LOLA x1, patent LIMA-D1 graft, patent SVG-LPDA graft, patent RAD-OM1 graft, pLCx mild disease, dLCx 99%, RCA (small vessel, non-dominant) 100%; LVEDP 15mmHg, no AS/MR, EF 60% by LVgram; Access: R CFA s/p Perclose. Per Fellow Dr. Koenig pt was then deemed an NSTEMI and was admitted to the PCU for further management and observation of NSTEMI s/p PCI.    Patient has been seen and examined at bedside this morning. Patient is out of bed ambulating with no complaints. Right groin access stable with no hematoma, no bleed, 2+ radial pulse. Lab values, telemetry and vital signs have been reviewed and remain stable. Discharge medication regimen has been reviewed with patient and Dr. Arzola; patient will continue to take Aspirin 81 mg daily, Effient 10 mg daily (Brilinta not covered under insurance, Effient copayment confirmed), Lipitor 80 mg daily, Fenofibrate 160 mg daily, Losartan 100 mg daily, Toprol XL 50 mg daily, Norvasc 10 mg daily, Protonix 40 mg daily. Patient has been cleared for discharge at this time per Dr. Arzola and patient will follow up with Dr. Payne at [address] [place] [address] 8/23/2021 @ 2:30 PM for a post PCI check up. All discharge instructions have been reviewed with patient and medications have been e-prescribed to patient’s preferred pharmacy. Prescription for cardiac rehab unable to be provided as machine not working. Patient given a list of locations and instructed to contact their insurance company to review participating providers in their network. Patient instructed to follow up appointment with their cardiologist who can further assist in cardiac rehab enrollment and write a prescription for them.  Education on benefits of cardiac rehab provided to patient.

## 2021-08-19 NOTE — DISCHARGE NOTE PROVIDER - CARE PROVIDER_API CALL
Erickson Payne)  Cardiology; Internal Medicine; Interventional Cardiology  [address] [place] [address]  Phone: [phone]  Fax: [phone]  Scheduled Appointment: 08/23/2021 02:30 PM

## 2021-08-19 NOTE — DISCHARGE NOTE NURSING/CASE MANAGEMENT/SOCIAL WORK - PATIENT PORTAL LINK FT
You can access the FollowMyHealth Patient Portal offered by [facility] by registering at the following website: [email or web address]. By joining [employer]’s FollowMyHealth portal, you will also be able to view your health information using other applications (apps) compatible with our system.

## 2021-08-19 NOTE — DISCHARGE NOTE PROVIDER - NSDCCPCAREPLAN_GEN_ALL_CORE_FT
PRINCIPAL DISCHARGE DIAGNOSIS  Diagnosis: NSTEMI (non-ST elevation myocardial infarction)  Assessment and Plan of Treatment: You had a cardiac angiogram with stent placement x 2 to one of your heart arteries (Left Anterior Descending). Continue Aspirin. You were started on Plavix to keep your stents open. DO NOT STOP TAKING ASPIRIN OR PLAVIX UNLESS INSTRUCTED BY YOUR CARDIOLOGIST AS YOUR STENTS CAN CLOSE RESULTING IN HEART ATTACK AND DEATH.       PRINCIPAL DISCHARGE DIAGNOSIS  Diagnosis: NSTEMI (non-ST elevation myocardial infarction)  Assessment and Plan of Treatment: You had a cardiac angiogram with stent placement x 2 to one of your heart arteries (Left Anterior Descending). Continue Aspirin. You were started on Brilinta to keep your stents open. DO NOT STOP TAKING ASPIRIN OR BRILINTA UNLESS INSTRUCTED BY YOUR CARDIOLOGIST AS YOUR STENTS CAN CLOSE RESULTING IN HEART ATTACK AND DEATH. Follow-up with cardiologist in 1 week Dr. Alicea.  DO NOT TAKE NSAIDS (ALEVE, IBUPROFEN, ADVIL, NAPROSYN, CELEBREX, ETC) AS THIS INCREASES RISK OF BLEEDING.   We have provided you with a prescription for cardiac rehab which is medically supervised exercise program for your heart and has been shown to improve the quantity and quality of life of people with heart disease like yours. You should attend cardiac rehab 3 times per week for 12 weeks. We have provided you with a list of nearby facilities. Please call your insurance carrier to determine which of these facilities are covered under your plan. Please bring this prescription with you to your follow up appointment with your cardiologist who can then further assist you to enroll into a cardiac rehab program.      SECONDARY DISCHARGE DIAGNOSES  Diagnosis: Hypertension  Assessment and Plan of Treatment: Maintain Low Salt Diet-Less than 2000 mg daily. Continue Amlodipine, Metoprolol, and Losartan. Monitor BP.    Diagnosis: Hyperlipidemia  Assessment and Plan of Treatment: Maintain Low Cholesterol Diet. Your Atorvastatin was increased to 80 mg. HAVE LIVER FUNCTION AND CHOLESTEROL LEVELS CHECKED IN 1 MONTH.    Diagnosis: Diabetes mellitus, type II  Assessment and Plan of Treatment: Monitor fingersticks before meals and at bedtime. Hemoglobin A1C (Average measurement of how well controlled blood sugar levels are over a three month period). 7.2% Goal <7%. DO NOT TAKE METFORMIN UNTIL SATURDAY 8/21/2021.     PRINCIPAL DISCHARGE DIAGNOSIS  Diagnosis: NSTEMI (non-ST elevation myocardial infarction)  Assessment and Plan of Treatment: -You had a cardiac angiogram (8/18/2021) with stent placement x 2 to one of your heart arteries (Left Anterior Descending). Continue Aspirin. You were started on Effient to keep your stents open. DO NOT STOP TAKING ASPIRIN OR EFFIENT UNLESS INSTRUCTED BY YOUR CARDIOLOGIST AS YOUR STENTS CAN CLOSE RESULTING IN HEART ATTACK AND DEATH.   Follow-up with cardiologist Dr. Payne 8/23/2021 @2:30 PM  DO NOT TAKE NSAIDS (ALEVE, IBUPROFEN, ADVIL, NAPROSYN, CELEBREX, ETC) AS THIS INCREASES RISK OF BLEEDING.   -Your procedure was done through your groin    -You do not need to keep this area covered and you may shower   -Please avoid any heavy lifting  (no more than 3 to 5 lbs) or strenuous activity for five days   -If you develop any swelling, bleeding, hardening of the skin (hematoma formation), acute pain, numbness/tingling  in your leg please contact your doctor immediately or call our 24/7 line: [phone]   -We have provided you with a prescription for cardiac rehab which is medically supervised exercise program for your heart and has been shown to improve the quantity and quality of life of people with heart disease like yours. You should attend cardiac rehab 3 times per week for 12 weeks. We have provided you with a list of nearby facilities. Please call your insurance carrier to determine which of these facilities are covered under your plan. Please bring this prescription with you to your follow up appointment with your cardiologist who can then further assist you to enroll into a cardiac rehab program.        SECONDARY DISCHARGE DIAGNOSES  Diagnosis: Hypertension  Assessment and Plan of Treatment: Maintain Low Salt Diet-Less than 2000 mg daily. Continue Amlodipine 10 mg daiyl , Metoprolol 50 mg daily, and Losartan 100 mg daily. Monitor BP.    Diagnosis: Hyperlipidemia  Assessment and Plan of Treatment: Maintain Low Cholesterol Diet. Your Atorvastatin was increased to 80 mg. HAVE LIVER FUNCTION AND CHOLESTEROL LEVELS CHECKED IN 1 MONTH.    Diagnosis: Diabetes mellitus, type II  Assessment and Plan of Treatment: Monitor fingersticks before meals and at bedtime. Hemoglobin A1C (Average measurement of how well controlled blood sugar levels are over a three month period). Your Hemoglobin A1c is 7.2% Goal <7%.   -DO NOT TAKE METFORMIN UNTIL SATURDAY 8/21/2021.  -This medication can interact with the contrast used during your procedure therefore we want to ensure the contrast has left your body prior to you restarting your Metformin.    -Make sure you monitor your finger sticks and diet while you are not taking your Metformin!       PRINCIPAL DISCHARGE DIAGNOSIS  Diagnosis: NSTEMI (non-ST elevation myocardial infarction)  Assessment and Plan of Treatment: -You were diagnosed with a heart attack and had a cardiac angiogram (8/18/2021) with stent placement x 2 to one of your heart arteries (Left Anterior Descending). Continue Aspirin. You were started on Effient to keep your stents open. DO NOT STOP TAKING ASPIRIN OR EFFIENT UNLESS INSTRUCTED BY YOUR CARDIOLOGIST AS YOUR STENTS CAN CLOSE RESULTING IN HEART ATTACK AND DEATH.   Follow-up with cardiologist Dr. Payne 8/23/2021 @2:30 PM  DO NOT TAKE NSAIDS (ALEVE, IBUPROFEN, ADVIL, NAPROSYN, CELEBREX, ETC) AS THIS INCREASES RISK OF BLEEDING.   -Your procedure was done through your groin    -You do not need to keep this area covered and you may shower   -Please avoid any heavy lifting  (no more than 3 to 5 lbs) or strenuous activity for five days   -If you develop any swelling, bleeding, hardening of the skin (hematoma formation), acute pain, numbness/tingling  in your leg please contact your doctor immediately or call our 24/7 line: [phone]   -We have provided you with a prescription for cardiac rehab which is medically supervised exercise program for your heart and has been shown to improve the quantity and quality of life of people with heart disease like yours. You should attend cardiac rehab 3 times per week for 12 weeks. We have provided you with a list of nearby facilities. Please call your insurance carrier to determine which of these facilities are covered under your plan. Please bring this prescription with you to your follow up appointment with your cardiologist who can then further assist you to enroll into a cardiac rehab program.        SECONDARY DISCHARGE DIAGNOSES  Diagnosis: Hypertension  Assessment and Plan of Treatment: Maintain Low Salt Diet-Less than 2000 mg daily. Continue Amlodipine 10 mg daiyl , Metoprolol 50 mg daily, and Losartan 100 mg daily. Monitor BP.    Diagnosis: Hyperlipidemia  Assessment and Plan of Treatment: Maintain Low Cholesterol Diet. Your Atorvastatin was increased to 80 mg. HAVE LIVER FUNCTION AND CHOLESTEROL LEVELS CHECKED IN 1 MONTH.    Diagnosis: Diabetes mellitus, type II  Assessment and Plan of Treatment: Monitor fingersticks before meals and at bedtime. Hemoglobin A1C (Average measurement of how well controlled blood sugar levels are over a three month period). Your Hemoglobin A1c is 7.2% Goal <7%.   -DO NOT TAKE METFORMIN UNTIL SATURDAY 8/21/2021.  -This medication can interact with the contrast used during your procedure therefore we want to ensure the contrast has left your body prior to you restarting your Metformin.    -Make sure you monitor your finger sticks and diet while you are not taking your Metformin!

## 2021-08-23 ENCOUNTER — APPOINTMENT (OUTPATIENT)
Dept: HEART AND VASCULAR | Facility: CLINIC | Age: 56
End: 2021-08-23

## 2021-08-24 DIAGNOSIS — E11.51 TYPE 2 DIABETES MELLITUS WITH DIABETIC PERIPHERAL ANGIOPATHY WITHOUT GANGRENE: ICD-10-CM

## 2021-08-24 DIAGNOSIS — I65.23 OCCLUSION AND STENOSIS OF BILATERAL CAROTID ARTERIES: ICD-10-CM

## 2021-08-24 DIAGNOSIS — Z95.1 PRESENCE OF AORTOCORONARY BYPASS GRAFT: ICD-10-CM

## 2021-08-24 DIAGNOSIS — I25.10 ATHEROSCLEROTIC HEART DISEASE OF NATIVE CORONARY ARTERY WITHOUT ANGINA PECTORIS: ICD-10-CM

## 2021-08-24 DIAGNOSIS — Z83.3 FAMILY HISTORY OF DIABETES MELLITUS: ICD-10-CM

## 2021-08-24 DIAGNOSIS — R07.89 OTHER CHEST PAIN: ICD-10-CM

## 2021-08-24 DIAGNOSIS — I21.4 NON-ST ELEVATION (NSTEMI) MYOCARDIAL INFARCTION: ICD-10-CM

## 2021-08-24 DIAGNOSIS — Z98.1 ARTHRODESIS STATUS: ICD-10-CM

## 2021-08-24 DIAGNOSIS — E78.5 HYPERLIPIDEMIA, UNSPECIFIED: ICD-10-CM

## 2021-08-24 DIAGNOSIS — E83.52 HYPERCALCEMIA: ICD-10-CM

## 2021-08-24 DIAGNOSIS — I10 ESSENTIAL (PRIMARY) HYPERTENSION: ICD-10-CM

## 2021-09-08 PROBLEM — E11.9 TYPE 2 DIABETES MELLITUS WITHOUT COMPLICATIONS: Chronic | Status: ACTIVE | Noted: 2021-08-18

## 2021-10-11 ENCOUNTER — APPOINTMENT (OUTPATIENT)
Dept: HEART AND VASCULAR | Facility: CLINIC | Age: 56
End: 2021-10-11

## 2022-02-17 ENCOUNTER — NON-APPOINTMENT (OUTPATIENT)
Age: 57
End: 2022-02-17

## 2022-09-09 ENCOUNTER — NON-APPOINTMENT (OUTPATIENT)
Age: 57
End: 2022-09-09

## 2022-11-29 NOTE — PATIENT PROFILE ADULT. - NS PRO MODE OF ARRIVAL
stretcher

## 2023-07-10 NOTE — ED ADULT NURSE NOTE - CHIEF COMPLAINT
Called patient, no answer, left voicemail to call the office back.   The patient is a 56y Male complaining of chest pain.

## 2023-07-20 NOTE — PATIENT PROFILE ADULT - BRADEN MOISTURE
Quality 226: Preventive Care And Screening: Tobacco Use: Screening And Cessation Intervention: Patient screened for tobacco use and is an ex/non-smoker
Detail Level: Detailed
(4) rarely moist

## 2023-08-07 NOTE — PHYSICAL THERAPY INITIAL EVALUATION ADULT - MANUAL MUSCLE TESTING RESULTS, REHAB EVAL
----- Message from Demetrice Navarro MD sent at 8/6/2023  8:30 PM CDT -----  Bone density shows osteopenia, an indication for surgery.  Parathyroidectomy as scheduled.  Thanks   grossly assessed due to/functional observation against gravity > 3/5 MMT

## 2023-09-25 ENCOUNTER — NON-APPOINTMENT (OUTPATIENT)
Age: 58
End: 2023-09-25

## 2023-10-11 ENCOUNTER — NON-APPOINTMENT (OUTPATIENT)
Age: 58
End: 2023-10-11

## 2024-01-08 NOTE — PATIENT PROFILE ADULT. - AS SC BRADEN FRICTION
----- Message from Reshma Baig MD sent at 1/8/2024  7:45 AM EST -----  Please call the patient --is she wanting to keep her follow up 2/26 or come in sooner? She had bmbx performed/resulted. Either is fine with me   (3) no apparent problem

## 2024-04-02 ENCOUNTER — NON-APPOINTMENT (OUTPATIENT)
Age: 59
End: 2024-04-02

## 2024-04-25 NOTE — ED ADULT NURSE NOTE - NEURO ASSESSMENT
WDL Follow up with your pediatrician within 2-3 days.     Take the prescribed medication as directed     Stay hydrated    Return to the ER if your symptoms worsen or for any other medical emergencies

## 2024-09-12 ENCOUNTER — NON-APPOINTMENT (OUTPATIENT)
Age: 59
End: 2024-09-12

## 2024-10-18 NOTE — ED ADULT NURSE NOTE - NURSING MUSC ROM
Cortisone Injection    You have received a cortisone injection. The medication is a combination of a short acting anesthetic (numbing medication)  plus a steroid.     You may see some relief from the pain for several hours following the injection due to the numbing medication. Later that day or the next day you may have the same pain you had before or an increase in soreness. Swelling can also occur.  You may try a cold compress for 20 minutes on and 20 minutes off that day or over the counter medications with food (if not allergic)    Our expectation would be that you will improve on a day by day basis for the next several weeks or even longer.     Cortisone may cause a temporary (usually 2-3 days) increase in blood glucose (sugar) levels. If you have diabetes, please pay particular attention to this.     Please call the office if there are no signs of improvement after 4 weeks or if other problems develop such as an increase in swelling or redness. Our office number is [phone]    Thank you for allowing us to be of service to you.     [facility].      full range of motion in all extremities

## 2025-02-28 ENCOUNTER — NON-APPOINTMENT (OUTPATIENT)
Age: 60
End: 2025-02-28

## 2025-03-17 NOTE — ED PROVIDER NOTE - ENMT NEGATIVE STATEMENT, MLM
Home
Ears: no ear pain and no hearing problems.Nose: no nasal congestion and no nasal drainage.Mouth/Throat: no dysphagia, no hoarseness and no throat pain.Neck: no lumps, no pain, no stiffness and no swollen glands.

## 2025-07-15 NOTE — PHYSICAL THERAPY INITIAL EVALUATION ADULT - ASR WT BEARING STATUS EVAL
Pt completed radiation to abdomen/left adrenal 6-30-25.  LM for pt to return call with callback number given.  Pt follows up with medical oncology/palliative  
BUE < 10 lbs 2/2 sternal precautions